# Patient Record
Sex: FEMALE | Race: WHITE | Employment: OTHER | URBAN - METROPOLITAN AREA
[De-identification: names, ages, dates, MRNs, and addresses within clinical notes are randomized per-mention and may not be internally consistent; named-entity substitution may affect disease eponyms.]

---

## 2018-09-18 ENCOUNTER — OFFICE VISIT (OUTPATIENT)
Dept: GYNECOLOGY | Age: 83
End: 2018-09-18

## 2018-09-18 ENCOUNTER — HOSPITAL ENCOUNTER (OUTPATIENT)
Dept: LAB | Age: 83
Discharge: HOME | End: 2018-09-18
Payer: MEDICARE

## 2018-09-18 ENCOUNTER — HOSPITAL ENCOUNTER (OUTPATIENT)
Dept: CT IMAGING | Age: 83
Discharge: HOME | End: 2018-09-18
Attending: OBSTETRICS & GYNECOLOGY
Payer: MEDICARE

## 2018-09-18 VITALS
SYSTOLIC BLOOD PRESSURE: 161 MMHG | HEART RATE: 69 BPM | BODY MASS INDEX: 27.6 KG/M2 | DIASTOLIC BLOOD PRESSURE: 88 MMHG | WEIGHT: 160.8 LBS

## 2018-09-18 DIAGNOSIS — C56.2 MALIGNANT NEOPLASM OF LEFT OVARY (HCC): Primary | ICD-10-CM

## 2018-09-18 DIAGNOSIS — R19.00 PELVIC MASS: ICD-10-CM

## 2018-09-18 DIAGNOSIS — C56.2 MALIGNANT NEOPLASM OF LEFT OVARY (HCC): ICD-10-CM

## 2018-09-18 LAB — CREAT BLD-MCNC: 1.5 MG/DL (ref 0.6–1.3)

## 2018-09-18 PROCEDURE — 36415 COLL VENOUS BLD VENIPUNCTURE: CPT

## 2018-09-18 PROCEDURE — 85025 COMPLETE CBC W/AUTO DIFF WBC: CPT

## 2018-09-18 PROCEDURE — 82565 ASSAY OF CREATININE: CPT

## 2018-09-18 PROCEDURE — 74176 CT ABD & PELVIS W/O CONTRAST: CPT

## 2018-09-18 PROCEDURE — 86301 IMMUNOASSAY TUMOR CA 19-9: CPT

## 2018-09-18 PROCEDURE — 80053 COMPREHEN METABOLIC PANEL: CPT

## 2018-09-18 PROCEDURE — 86304 IMMUNOASSAY TUMOR CA 125: CPT

## 2018-09-18 RX ORDER — SODIUM CHLORIDE 0.9 % (FLUSH) 0.9 %
10 SYRINGE (ML) INJECTION
Status: DISCONTINUED | OUTPATIENT
Start: 2018-09-18 | End: 2018-09-18 | Stop reason: CLARIF

## 2018-09-18 NOTE — PROGRESS NOTES
[address] [place] [address] Mathias Moritz 020, [address]14 [facility]  [phone]  MD Domingo Umanzor MD Lajuan Arena, NP    Office Note  Patient ID:  Name:  Charo Miller  MRN:  [id number]  :  6/3/1927/89 y.o. Date:  2018      HISTORY OF PRESENT ILLNESS:  Charo Miller is a 80 y.o.  postmenopausal female who was referred to me for a pelvic mass in 2016 by Dr. Adriane Youssef in [facility]. She reported having a hysterectomy in  for fibroids and ovarian cancer. She said they caught it early so she only needed a little chemotherapy. She presented to the ER at [facility] complaining of vaginal bleeding. A CT was performed there revealed a pelvic mass. They actually called it a uterus, despite her history of hysterectomy. She was also noted to have bilateral hydronephrosis, left > right. I was asked to see her for consultation and for further evaluation and management. On her initial visit I performed a transvaginal biopsy of the pelvic mass. I also sent her for tumor markers. Her CEA was normal.  Her CA-125 was normal.  Her CA 19-9 was elevated at 338. FINAL PATHOLOGIC DIAGNOSIS   Cervix/vaginal apex, biopsy:   High-grade adenocarcinoma     I also requested that she try to obtain a copy of the CT on disc and to see if they could find her initial pathology report from . This demonstrated a stage IIC, poorly differentiated, ovarian carcinoma, endometrioid type. She presented to review the pathology results and the CT images, as well as to discuss a definitive treatment plan. I did not think she was a good candidate for surgical resection at the time, as I didn't think I could could clear surgical margins. Due to her age I recommended weekly Taxol/Carbo, rather than every 3 weeks. I felt that she would tolerate that much better.   Because she lived in [facility], traveling back and forth to [place] would be difficult for her, I referred her to Dr. Arnaldo Roland with [facility] [place]. She was treated with chemotherapy with Dr. Jarad Martins and apparently had a good response. Her most recent CA-125 was normal.  She last saw him in the office a couple of months ago. She reports some recent vaginal spotting, similar to when she was diagnosed with recurrent disease. ROS:   and GI review:  Negative  Cardiopulmonary review:  Negative   Musculoskeletal:  Negative    A comprehensive review of systems was negative except for that written in the History of Present Illness. , 10 point ROS        Problem List:  Patient Active Problem List    Diagnosis Date Noted    Malignant neoplasm of left ovary ([facility] [address] 12/06/2016    Pelvic mass 11/29/2016     PMH:  Past Medical History:   Diagnosis Date    Cancer ([facility] [address] 2001    Ovarian Cancer    Diabetes ([facility] [address]       PSH:  Past Surgical History:   Procedure Laterality Date    ABDOMEN SURGERY PROC UNLISTED  1943    Appendectomy    HX HYSTERECTOMY  2001      Social History:  Social History   Substance Use Topics    Smoking status: Never Smoker    Smokeless tobacco: Never Used    Alcohol use No      Family History:  Family History   Problem Relation Age of Onset    Heart Disease Sister       Medications: (reviewed)  Current Outpatient Prescriptions   Medication Sig    hydroCHLOROthiazide (HYDRODIURIL) 25 mg tablet TAKE 1 TABLET BY MOUTH 3 TIMES A DAY    sAXagliptin (ONGLYZA) 5 mg tab tablet Take  by mouth daily.  losartan (COZAAR) 100 mg tablet Take 100 mg by mouth daily.  hydrALAZINE (APRESOLINE) 25 mg tablet Take 25 mg by mouth three (3) times daily.  enalapril (VASOTEC) 20 mg tablet Take 20 mg by mouth daily.  glipiZIDE (GLUCOTROL) 10 mg tablet Take 10 mg by mouth two (2) times a day.  latanoprost (XALATAN) 0.005 % ophthalmic solution Administer 1 Drop to both eyes nightly.      No current facility-administered medications for this visit. Allergies: (reviewed)  No Known Allergies       OBJECTIVE:    Physical Exam:  VITAL SIGNS: Vitals:    09/18/18 1342   BP: 161/88   Pulse: 69   Weight: 160 lb 12.8 oz (72.9 kg)     Body mass index is 27.6 kg/(m^2). GENERAL ALEENA: Conversant, alert, oriented. No acute distress. HEENT: HEENT. No thyroid enlargement. No JVD. Neck: Supple without restrictions. RESPIRATORY: Clear to auscultation and percussion to the bases. No CVAT. CARDIOVASC: RRR without murmur/rub. GASTROINT: soft, non-tender, without masses or organomegaly   MUSCULOSKEL: no joint tenderness, deformity or swelling   EXTREMITIES: extremities normal, atraumatic, no cyanosis or edema   PELVIC: Normal external genitalia. Normal vagina. Mass at vaginal apex measuring about 4 x 4 cm, consistent with residual/recurrent disease. The mass is mobile and non-tender. RECTAL: Deferred   ABE SURVEY: No suspicious lymphadenopathy or edema noted. NEURO: Grossly intact. No acute deficit. CT of chest/abdomen/pelvis (6/24/17)  Chest:  Stable right basilar subsegmental atelectasis and scattered pulmonary thin-walled cysts.  No new pulmonary nodule, or airspace disease.  No pleural or pericardial effusion. Stable shotty mediastinal lymph nodes.  No axillary or hilar adenopathy. Stable position of right IJ chest port. Stable 3 cm low-attenuation lesion in the left thyroid gland. Normal heart size with coronary artery calcifications as before.  Small hiatal hernia, unchanged. Abdomen/pelvis:  No interval change in mild left hydroureteronephrosis with the cause for the hydronephrosis not clearly identified.  No interval change in the left adnexal postsurgical changes which wrap around the left ureter.  Stable atrophic uterus versus supracervical hysterectomy postsurgical change.  No new pelvic masses or adenopathy. Bowel gas pattern is nonobstructed.   The liver, gallbladder, pancreas, spleen, adrenals and right kidney are also stable.  No interval change in 9 mm right adrenal gland nodule most likely a benign adenoma. No peritoneal or mesenteric implants or nodules identified. No free fluid. Skeleton: No concerning lytic or blastic lesions identified. Impression:  1.  No CT evidence of tumor recurrence in the chest, abdomen or pelvis. 2.  Stable chronic mild proximal left hydroureteronephrosis, presumably treatment related. 3.  Stable additional chronic findings as above. IMPRESSION/PLAN:  Kym Narayanan is a 80 y.o. female with a history of recurrent ovarian cancer. She likely has recurrent/peristent disease at this time. I did not repeat a biopsy today. I am going to send her for a CT scan today. I am also going to repeat tumor markers, including a CA 19-9, which was initially elevated. I will see her back after the scan to see if she might be a candidate for surgical excision.         Signed By: Madeline Mera MD     9/18/2018/1:39 PM

## 2018-09-18 NOTE — PROGRESS NOTES
Yearly check up. Patient states no abnormal spotting or bleeding. Patient states no questions or concerns for today's visit.

## 2018-09-19 LAB
ALBUMIN SERPL-MCNC: 4 G/DL (ref 3.2–4.6)
ALBUMIN/GLOB SERPL: 1.6 {RATIO} (ref 1.2–2.2)
ALP SERPL-CCNC: 57 IU/L (ref 39–117)
ALT SERPL-CCNC: 17 IU/L (ref 0–32)
AST SERPL-CCNC: 20 IU/L (ref 0–40)
BASOPHILS # BLD AUTO: 0.1 X10E3/UL (ref 0–0.2)
BASOPHILS NFR BLD AUTO: 1 %
BILIRUB SERPL-MCNC: 0.3 MG/DL (ref 0–1.2)
BUN SERPL-MCNC: 36 MG/DL (ref 10–36)
BUN/CREAT SERPL: 24 (ref 12–28)
CALCIUM SERPL-MCNC: 9.5 MG/DL (ref 8.7–10.3)
CANCER AG125 SERPL-ACNC: 13.1 U/ML (ref 0–38.1)
CANCER AG19-9 SERPL-ACNC: 11 U/ML (ref 0–35)
CHLORIDE SERPL-SCNC: 98 MMOL/L (ref 96–106)
CO2 SERPL-SCNC: 22 MMOL/L (ref 20–29)
CREAT SERPL-MCNC: 1.48 MG/DL (ref 0.57–1)
EOSINOPHIL # BLD AUTO: 0.6 X10E3/UL (ref 0–0.4)
EOSINOPHIL NFR BLD AUTO: 6 %
ERYTHROCYTE [DISTWIDTH] IN BLOOD BY AUTOMATED COUNT: 13.6 % (ref 12.3–15.4)
GLOBULIN SER CALC-MCNC: 2.5 G/DL (ref 1.5–4.5)
GLUCOSE SERPL-MCNC: 96 MG/DL (ref 65–99)
HCT VFR BLD AUTO: 34.7 % (ref 34–46.6)
HGB BLD-MCNC: 11.4 G/DL (ref 11.1–15.9)
IMM GRANULOCYTES # BLD: 0 X10E3/UL (ref 0–0.1)
IMM GRANULOCYTES NFR BLD: 0 %
LYMPHOCYTES # BLD AUTO: 3.1 X10E3/UL (ref 0.7–3.1)
LYMPHOCYTES NFR BLD AUTO: 31 %
MCH RBC QN AUTO: 30 PG (ref 26.6–33)
MCHC RBC AUTO-ENTMCNC: 32.9 G/DL (ref 31.5–35.7)
MCV RBC AUTO: 91 FL (ref 79–97)
MONOCYTES # BLD AUTO: 0.8 X10E3/UL (ref 0.1–0.9)
MONOCYTES NFR BLD AUTO: 8 %
NEUTROPHILS # BLD AUTO: 5.5 X10E3/UL (ref 1.4–7)
NEUTROPHILS NFR BLD AUTO: 54 %
PLATELET # BLD AUTO: 336 X10E3/UL (ref 150–379)
POTASSIUM SERPL-SCNC: 5.9 MMOL/L (ref 3.5–5.2)
PROT SERPL-MCNC: 6.5 G/DL (ref 6–8.5)
RBC # BLD AUTO: 3.8 X10E6/UL (ref 3.77–5.28)
SODIUM SERPL-SCNC: 133 MMOL/L (ref 134–144)
WBC # BLD AUTO: 10 X10E3/UL (ref 3.4–10.8)

## 2018-09-25 ENCOUNTER — OFFICE VISIT (OUTPATIENT)
Dept: GYNECOLOGY | Age: 83
End: 2018-09-25

## 2018-09-25 VITALS
HEIGHT: 64 IN | WEIGHT: 160 LBS | BODY MASS INDEX: 27.31 KG/M2 | HEART RATE: 70 BPM | DIASTOLIC BLOOD PRESSURE: 94 MMHG | SYSTOLIC BLOOD PRESSURE: 176 MMHG

## 2018-09-25 DIAGNOSIS — R19.00 PELVIC MASS: Primary | ICD-10-CM

## 2018-09-25 DIAGNOSIS — C56.2 MALIGNANT NEOPLASM OF LEFT OVARY (HCC): ICD-10-CM

## 2018-09-25 NOTE — PROGRESS NOTES
[address] [place] [address] Mathias Moritz [address], [address] [facility]  [phone]  MD Freddie Greer MD Kenith Mullet, NP    Office Note  Patient ID:  Name:  Princess Fernando  MRN:  [id number]  :  6/3/1927/89 y.o. Date:  2018      HISTORY OF PRESENT ILLNESS:  Princess Fernando is a 80 y.o.  postmenopausal female who was referred to me for a pelvic mass in 2016 by Dr. Herny Briggs in [facility]. She reported having a hysterectomy in  for fibroids and ovarian cancer. She said they caught it early so she only needed a little chemotherapy. She presented to the ER at [facility] complaining of vaginal bleeding. A CT was performed there revealed a pelvic mass. They actually called it a uterus, despite her history of hysterectomy. She was also noted to have bilateral hydronephrosis, left > right. I was asked to see her for consultation and for further evaluation and management. On her initial visit I performed a transvaginal biopsy of the pelvic mass. I also sent her for tumor markers. Her CEA was normal.  Her CA-125 was normal.  Her CA 19-9 was elevated at 338. FINAL PATHOLOGIC DIAGNOSIS   Cervix/vaginal apex, biopsy:   High-grade adenocarcinoma     I also requested that she try to obtain a copy of the CT on disc and to see if they could find her initial pathology report from . This demonstrated a stage IIC, poorly differentiated, ovarian carcinoma, favor endometrioid type. She presented to review the pathology results and the CT images, as well as to discuss a definitive treatment plan. I did not think she was a good candidate for surgical resection at the time, as I didn't think I could could clear surgical margins. Due to her age I recommended weekly Taxol/Carbo, rather than every 3 weeks. I felt that she would tolerate that much better.   Because she lived in [facility], traveling back and forth to [place] would be difficult for her, I referred her to Dr. Amie Garcia with [facility] [address] [place] [address]. She was treated with chemotherapy with Dr. Jason Waters and apparently had a good response. Her most recent CA-125 was normal.  She last saw him in the office a couple of months ago. She reported some recent vaginal spotting, similar to when she was diagnosed with recurrent disease. I saw her in the office on 9/18/18 and performed an exam  I noted a 4 x 4 cm lesion at the left vaginal apex, consistent with residual/recurrent disease. It was friable on exam.  I sent her for a CT of the abdomen/pelvis to evaluate. I also checked tumor markers. Her CA-125 and CA 19-9 were both normal.        ROS:   and GI review:  Negative  Cardiopulmonary review:  Negative   Musculoskeletal:  Negative    A comprehensive review of systems was negative except for that written in the History of Present Illness. , 10 point ROS        Problem List:  Patient Active Problem List    Diagnosis Date Noted    Malignant neoplasm of left ovary ([facility] [address] 12/06/2016    Pelvic mass 11/29/2016     PMH:  Past Medical History:   Diagnosis Date    Cancer ([address] 2001    Ovarian Cancer    Diabetes ([facility] [address]       PSH:  Past Surgical History:   Procedure Laterality Date    ABDOMEN SURGERY PROC UNLISTED  1943    Appendectomy    HX HYSTERECTOMY  2001      Social History:  Social History   Substance Use Topics    Smoking status: Never Smoker    Smokeless tobacco: Never Used    Alcohol use No      Family History:  Family History   Problem Relation Age of Onset    Heart Disease Sister       Medications: (reviewed)  Current Outpatient Prescriptions   Medication Sig    hydroCHLOROthiazide (HYDRODIURIL) 25 mg tablet TAKE 1 TABLET BY MOUTH 3 TIMES A DAY    sAXagliptin (ONGLYZA) 5 mg tab tablet Take  by mouth daily.  losartan (COZAAR) 100 mg tablet Take 100 mg by mouth daily.     hydrALAZINE (APRESOLINE) 25 mg tablet Take 25 mg by mouth three (3) times daily.  enalapril (VASOTEC) 20 mg tablet Take 20 mg by mouth daily.  glipiZIDE (GLUCOTROL) 10 mg tablet Take 10 mg by mouth two (2) times a day.  latanoprost (XALATAN) 0.005 % ophthalmic solution Administer 1 Drop to both eyes nightly. No current facility-administered medications for this visit. Allergies: (reviewed)  No Known Allergies       OBJECTIVE:    Physical Exam:  VITAL SIGNS: Vitals:    09/25/18 1334   BP: (!) 176/94   Pulse: 70   Weight: 160 lb (72.6 kg)   Height: 5' 4.02\" (1.626 m)     Body mass index is 27.45 kg/(m^2). GENERAL ALEENA: Conversant, alert, oriented. No acute distress. HEENT: HEENT. No thyroid enlargement. No JVD. Neck: Supple without restrictions. RESPIRATORY: Clear to auscultation and percussion to the bases. No CVAT. CARDIOVASC: RRR without murmur/rub. GASTROINT: soft, non-tender, without masses or organomegaly   MUSCULOSKEL: no joint tenderness, deformity or swelling   EXTREMITIES: extremities normal, atraumatic, no cyanosis or edema   PELVIC: (9/18/18 exam) Normal external genitalia. Normal vagina. Mass at vaginal apex measuring about 4 x 4 cm, consistent with residual/recurrent disease. The mass is mobile and non-tender. RECTAL: Deferred   ABE SURVEY: No suspicious lymphadenopathy or edema noted. NEURO: Grossly intact. No acute deficit. CT of abdomen/pelvis (9/18/18)  LOWER CHEST: There is minimal scar or atelectasis at the lung bases. There are  calcifications of the aortic valve and coronary arteries. The absence of intravenous contrast material reduces the sensitivity for  evaluation of the solid parenchymal organs of the abdomen. ABDOMEN:  Liver: The liver is normal in size and contour with no focal abnormality. Gallbladder and bile ducts: There are no calcified stones and there is no  biliary duct dilatation. Spleen: No abnormality. Pancreas: No abnormality. Adrenal glands: No abnormality.   Kidneys: There is a small left renal cyst and left renal cortical thinning. There is no renal or ureteral calculus or obstruction. PELVIS:  Reproductive organs: The uterus and ovaries are absent. There is a 3.5 x 3.1 x  3.4 cm soft tissue mass in the left pelvic cul-de-sac between the urinary  bladder and rectum. Bladder: No abnormality. RETROPERITONEUM: The aorta is atherosclerotic and tapers without aneurysm. There  is no retroperitoneal adenopathy or mass. There is no pelvic mass or adenopathy. BOWEL AND MESENTERY: The small bowel is normal. The appendix is absent. PERITONEUM: There is no ascites or free intraperitoneal air. BONES AND SOFT TISSUES: There are degenerative changes of the spine. There are  surgical clips in the upper anterior abdominal wall. IMPRESSION:   1. Status post hysterectomy and bilateral oophorectomy. 2. Left pelvic soft tissue mass. 3. No other evidence of recurrent or metastatic disease within the abdomen or  pelvis. 4. 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  5. Atherosclerotic abdominal aorta without aneurysm. 6. Status post appendectomy. 7. Lumbar spondylosis. IMPRESSION/PLAN:  Kath Hanley is a 80 y.o. female with a history of recurrent ovarian cancer. She now has recurrent mass at the left upper vagina. She had a supracervical hysterectomy, but this does not appear consistent with cervix. We discussed options but we decided on laparoscopic evaluation with resection. She was counseled on the risks, benefits, indications, and alternatives of surgery. Her questions were answered and she wishes to proceed.          Signed By: Duke Chaudhry MD     9/25/2018/1:39 PM

## 2018-10-08 ENCOUNTER — ANESTHESIA EVENT (OUTPATIENT)
Dept: SURGERY | Age: 83
DRG: 747 | End: 2018-10-08
Payer: MEDICARE

## 2018-10-08 ENCOUNTER — ANESTHESIA (OUTPATIENT)
Dept: SURGERY | Age: 83
DRG: 747 | End: 2018-10-08
Payer: MEDICARE

## 2018-10-08 ENCOUNTER — HOSPITAL ENCOUNTER (INPATIENT)
Age: 83
LOS: 1 days | Discharge: HOME | DRG: 747 | End: 2018-10-09
Attending: OBSTETRICS & GYNECOLOGY | Admitting: OBSTETRICS & GYNECOLOGY
Payer: MEDICARE

## 2018-10-08 DIAGNOSIS — G89.18 POSTOPERATIVE PAIN: Primary | ICD-10-CM

## 2018-10-08 PROBLEM — C76.3 PELVIC CANCER (HCC): Status: ACTIVE | Noted: 2018-10-08

## 2018-10-08 LAB
ATRIAL RATE: 64 BPM
CALCULATED P AXIS, ECG09: 43 DEGREES
CALCULATED R AXIS, ECG10: 0 DEGREES
CALCULATED T AXIS, ECG11: 24 DEGREES
DIAGNOSIS, 93000: NORMAL
GLUCOSE BLD STRIP.AUTO-MCNC: 101 MG/DL (ref 65–100)
GLUCOSE BLD STRIP.AUTO-MCNC: 120 MG/DL (ref 65–100)
GLUCOSE BLD STRIP.AUTO-MCNC: 127 MG/DL (ref 65–100)
P-R INTERVAL, ECG05: 212 MS
Q-T INTERVAL, ECG07: 412 MS
QRS DURATION, ECG06: 94 MS
QTC CALCULATION (BEZET), ECG08: 425 MS
SERVICE CMNT-IMP: ABNORMAL
VENTRICULAR RATE, ECG03: 64 BPM

## 2018-10-08 PROCEDURE — 0UBG7ZX EXCISION OF VAGINA, VIA NATURAL OR ARTIFICIAL OPENING, DIAGNOSTIC: ICD-10-PCS | Performed by: OBSTETRICS & GYNECOLOGY

## 2018-10-08 PROCEDURE — 74011250636 HC RX REV CODE- 250/636

## 2018-10-08 PROCEDURE — 65410000002 HC RM PRIVATE OB

## 2018-10-08 PROCEDURE — 77030002968 HC SUT PDS LSIS -B: Performed by: OBSTETRICS & GYNECOLOGY

## 2018-10-08 PROCEDURE — 77030031139 HC SUT VCRL2 J&J -A: Performed by: OBSTETRICS & GYNECOLOGY

## 2018-10-08 PROCEDURE — 74011250636 HC RX REV CODE- 250/636: Performed by: ANESTHESIOLOGY

## 2018-10-08 PROCEDURE — 77030019908 HC STETH ESOPH SIMS -A: Performed by: ANESTHESIOLOGY

## 2018-10-08 PROCEDURE — 88112 CYTOPATH CELL ENHANCE TECH: CPT | Performed by: OBSTETRICS & GYNECOLOGY

## 2018-10-08 PROCEDURE — 74011250637 HC RX REV CODE- 250/637: Performed by: PHYSICIAN ASSISTANT

## 2018-10-08 PROCEDURE — 74011000250 HC RX REV CODE- 250

## 2018-10-08 PROCEDURE — 77030035045 HC TRCR ENDOSC VRSPRT BLDLSS COVD -B: Performed by: OBSTETRICS & GYNECOLOGY

## 2018-10-08 PROCEDURE — 77030010031 HC SCIS ENDOSC MPLR J&J -C: Performed by: OBSTETRICS & GYNECOLOGY

## 2018-10-08 PROCEDURE — 74011250636 HC RX REV CODE- 250/636: Performed by: OBSTETRICS & GYNECOLOGY

## 2018-10-08 PROCEDURE — 77030035051: Performed by: OBSTETRICS & GYNECOLOGY

## 2018-10-08 PROCEDURE — 77030026438 HC STYL ET INTUB CARD -A: Performed by: ANESTHESIOLOGY

## 2018-10-08 PROCEDURE — 88305 TISSUE EXAM BY PATHOLOGIST: CPT | Performed by: OBSTETRICS & GYNECOLOGY

## 2018-10-08 PROCEDURE — 74011000258 HC RX REV CODE- 258: Performed by: OBSTETRICS & GYNECOLOGY

## 2018-10-08 PROCEDURE — 0UBC4ZZ EXCISION OF CERVIX, PERCUTANEOUS ENDOSCOPIC APPROACH: ICD-10-PCS | Performed by: OBSTETRICS & GYNECOLOGY

## 2018-10-08 PROCEDURE — 74011000250 HC RX REV CODE- 250: Performed by: PHYSICIAN ASSISTANT

## 2018-10-08 PROCEDURE — 77030020782 HC GWN BAIR PAWS FLX 3M -B

## 2018-10-08 PROCEDURE — 88307 TISSUE EXAM BY PATHOLOGIST: CPT | Performed by: OBSTETRICS & GYNECOLOGY

## 2018-10-08 PROCEDURE — 77030002933 HC SUT MCRYL J&J -A: Performed by: OBSTETRICS & GYNECOLOGY

## 2018-10-08 PROCEDURE — 77030008771 HC TU NG SALEM SUMP -A: Performed by: ANESTHESIOLOGY

## 2018-10-08 PROCEDURE — 77030035048 HC TRCR ENDOSC OPTCL COVD -B: Performed by: OBSTETRICS & GYNECOLOGY

## 2018-10-08 PROCEDURE — 76060000037 HC ANESTHESIA 3 TO 3.5 HR: Performed by: OBSTETRICS & GYNECOLOGY

## 2018-10-08 PROCEDURE — 77030018836 HC SOL IRR NACL ICUM -A: Performed by: OBSTETRICS & GYNECOLOGY

## 2018-10-08 PROCEDURE — 77030013079 HC BLNKT BAIR HGGR 3M -A: Performed by: ANESTHESIOLOGY

## 2018-10-08 PROCEDURE — 88331 PATH CONSLTJ SURG 1 BLK 1SPC: CPT | Performed by: OBSTETRICS & GYNECOLOGY

## 2018-10-08 PROCEDURE — 93005 ELECTROCARDIOGRAM TRACING: CPT

## 2018-10-08 PROCEDURE — 77030039266 HC ADH SKN EXOFIN S2SG -A: Performed by: OBSTETRICS & GYNECOLOGY

## 2018-10-08 PROCEDURE — 77030008684 HC TU ET CUF COVD -B: Performed by: ANESTHESIOLOGY

## 2018-10-08 PROCEDURE — 77030034154 HC SHR COAG HARM ACE J&J -F: Performed by: OBSTETRICS & GYNECOLOGY

## 2018-10-08 PROCEDURE — 77030002904 HC SUT DEV RNNG LSIS -C: Performed by: OBSTETRICS & GYNECOLOGY

## 2018-10-08 PROCEDURE — 0TJB8ZZ INSPECTION OF BLADDER, VIA NATURAL OR ARTIFICIAL OPENING ENDOSCOPIC: ICD-10-PCS | Performed by: OBSTETRICS & GYNECOLOGY

## 2018-10-08 PROCEDURE — 77030019927 HC TBNG IRR CYSTO BAXT -A: Performed by: OBSTETRICS & GYNECOLOGY

## 2018-10-08 PROCEDURE — 74011000250 HC RX REV CODE- 250: Performed by: OBSTETRICS & GYNECOLOGY

## 2018-10-08 PROCEDURE — 77030020263 HC SOL INJ SOD CL0.9% LFCR 1000ML: Performed by: OBSTETRICS & GYNECOLOGY

## 2018-10-08 PROCEDURE — 77030002903 HC SUT DEV PLCMNT LSIS -C: Performed by: OBSTETRICS & GYNECOLOGY

## 2018-10-08 PROCEDURE — 77030018846 HC SOL IRR STRL H20 ICUM -A: Performed by: OBSTETRICS & GYNECOLOGY

## 2018-10-08 PROCEDURE — 77030018832 HC SOL IRR H20 ICUM -A: Performed by: OBSTETRICS & GYNECOLOGY

## 2018-10-08 PROCEDURE — 76010000132 HC OR TIME 2.5 TO 3 HR: Performed by: OBSTETRICS & GYNECOLOGY

## 2018-10-08 PROCEDURE — 74011250636 HC RX REV CODE- 250/636: Performed by: PHYSICIAN ASSISTANT

## 2018-10-08 PROCEDURE — 76210000017 HC OR PH I REC 1.5 TO 2 HR: Performed by: OBSTETRICS & GYNECOLOGY

## 2018-10-08 PROCEDURE — 77030034696 HC CATH URETH FOL 2W BARD -A: Performed by: OBSTETRICS & GYNECOLOGY

## 2018-10-08 PROCEDURE — 77030032490 HC SLV COMPR SCD KNE COVD -B: Performed by: OBSTETRICS & GYNECOLOGY

## 2018-10-08 PROCEDURE — 77030032060 HC PWDR HEMSTAT ARISTA ASRB 3GM BARD -C: Performed by: OBSTETRICS & GYNECOLOGY

## 2018-10-08 PROCEDURE — 77030011640 HC PAD GRND REM COVD -A: Performed by: OBSTETRICS & GYNECOLOGY

## 2018-10-08 PROCEDURE — 82962 GLUCOSE BLOOD TEST: CPT

## 2018-10-08 PROCEDURE — 77030002882 HC SUT CART KNOT LSIS -B: Performed by: OBSTETRICS & GYNECOLOGY

## 2018-10-08 PROCEDURE — 77030027743 HC APPL F/HEMSTAT BARD -B: Performed by: OBSTETRICS & GYNECOLOGY

## 2018-10-08 PROCEDURE — 77030008756 HC TU IRR SUC STRY -B: Performed by: OBSTETRICS & GYNECOLOGY

## 2018-10-08 RX ORDER — DIPHENHYDRAMINE HCL 25 MG
25 CAPSULE ORAL
Status: DISCONTINUED | OUTPATIENT
Start: 2018-10-08 | End: 2018-10-09 | Stop reason: HOSPADM

## 2018-10-08 RX ORDER — LATANOPROST 50 UG/ML
1 SOLUTION/ DROPS OPHTHALMIC
Status: DISCONTINUED | OUTPATIENT
Start: 2018-10-08 | End: 2018-10-09 | Stop reason: HOSPADM

## 2018-10-08 RX ORDER — SODIUM CHLORIDE 0.9 % (FLUSH) 0.9 %
5-10 SYRINGE (ML) INJECTION EVERY 8 HOURS
Status: DISCONTINUED | OUTPATIENT
Start: 2018-10-08 | End: 2018-10-09 | Stop reason: HOSPADM

## 2018-10-08 RX ORDER — MAGNESIUM SULFATE 100 %
4 CRYSTALS MISCELLANEOUS AS NEEDED
Status: DISCONTINUED | OUTPATIENT
Start: 2018-10-08 | End: 2018-10-09 | Stop reason: HOSPADM

## 2018-10-08 RX ORDER — SODIUM CHLORIDE, SODIUM LACTATE, POTASSIUM CHLORIDE, CALCIUM CHLORIDE 600; 310; 30; 20 MG/100ML; MG/100ML; MG/100ML; MG/100ML
INJECTION, SOLUTION INTRAVENOUS
Status: DISCONTINUED | OUTPATIENT
Start: 2018-10-08 | End: 2018-10-08 | Stop reason: HOSPADM

## 2018-10-08 RX ORDER — FUROSEMIDE 10 MG/ML
INJECTION INTRAMUSCULAR; INTRAVENOUS AS NEEDED
Status: DISCONTINUED | OUTPATIENT
Start: 2018-10-08 | End: 2018-10-08 | Stop reason: HOSPADM

## 2018-10-08 RX ORDER — PHENYLEPHRINE HCL IN 0.9% NACL 0.4MG/10ML
SYRINGE (ML) INTRAVENOUS AS NEEDED
Status: DISCONTINUED | OUTPATIENT
Start: 2018-10-08 | End: 2018-10-08 | Stop reason: HOSPADM

## 2018-10-08 RX ORDER — ACETAMINOPHEN 10 MG/ML
INJECTION, SOLUTION INTRAVENOUS AS NEEDED
Status: DISCONTINUED | OUTPATIENT
Start: 2018-10-08 | End: 2018-10-08 | Stop reason: HOSPADM

## 2018-10-08 RX ORDER — OXYCODONE HYDROCHLORIDE 5 MG/1
5 TABLET ORAL
Status: DISCONTINUED | OUTPATIENT
Start: 2018-10-08 | End: 2018-10-09 | Stop reason: HOSPADM

## 2018-10-08 RX ORDER — SODIUM CHLORIDE 9 MG/ML
50 INJECTION, SOLUTION INTRAVENOUS CONTINUOUS
Status: DISCONTINUED | OUTPATIENT
Start: 2018-10-08 | End: 2018-10-08 | Stop reason: HOSPADM

## 2018-10-08 RX ORDER — GLYCOPYRROLATE 0.2 MG/ML
INJECTION INTRAMUSCULAR; INTRAVENOUS AS NEEDED
Status: DISCONTINUED | OUTPATIENT
Start: 2018-10-08 | End: 2018-10-08 | Stop reason: HOSPADM

## 2018-10-08 RX ORDER — METHYLENE BLUE 10 MG/ML
INJECTION INTRAVENOUS AS NEEDED
Status: DISCONTINUED | OUTPATIENT
Start: 2018-10-08 | End: 2018-10-08 | Stop reason: HOSPADM

## 2018-10-08 RX ORDER — OXYCODONE AND ACETAMINOPHEN 5; 325 MG/1; MG/1
1 TABLET ORAL AS NEEDED
Status: DISCONTINUED | OUTPATIENT
Start: 2018-10-08 | End: 2018-10-08 | Stop reason: HOSPADM

## 2018-10-08 RX ORDER — SODIUM CHLORIDE 0.9 % (FLUSH) 0.9 %
5-10 SYRINGE (ML) INJECTION EVERY 8 HOURS
Status: DISCONTINUED | OUTPATIENT
Start: 2018-10-08 | End: 2018-10-08 | Stop reason: HOSPADM

## 2018-10-08 RX ORDER — POLYETHYLENE GLYCOL 3350 17 G/17G
17 POWDER, FOR SOLUTION ORAL DAILY
Qty: 238 G | Refills: 0 | Status: SHIPPED | OUTPATIENT
Start: 2018-10-08

## 2018-10-08 RX ORDER — DEXAMETHASONE SODIUM PHOSPHATE 4 MG/ML
INJECTION, SOLUTION INTRA-ARTICULAR; INTRALESIONAL; INTRAMUSCULAR; INTRAVENOUS; SOFT TISSUE AS NEEDED
Status: DISCONTINUED | OUTPATIENT
Start: 2018-10-08 | End: 2018-10-08 | Stop reason: HOSPADM

## 2018-10-08 RX ORDER — MIDAZOLAM HYDROCHLORIDE 1 MG/ML
1 INJECTION, SOLUTION INTRAMUSCULAR; INTRAVENOUS AS NEEDED
Status: DISCONTINUED | OUTPATIENT
Start: 2018-10-08 | End: 2018-10-08 | Stop reason: HOSPADM

## 2018-10-08 RX ORDER — MORPHINE SULFATE 4 MG/ML
INJECTION, SOLUTION INTRAMUSCULAR; INTRAVENOUS AS NEEDED
Status: DISCONTINUED | OUTPATIENT
Start: 2018-10-08 | End: 2018-10-08 | Stop reason: HOSPADM

## 2018-10-08 RX ORDER — INSULIN LISPRO 100 [IU]/ML
INJECTION, SOLUTION INTRAVENOUS; SUBCUTANEOUS
Status: DISCONTINUED | OUTPATIENT
Start: 2018-10-08 | End: 2018-10-09 | Stop reason: HOSPADM

## 2018-10-08 RX ORDER — MIDAZOLAM HYDROCHLORIDE 1 MG/ML
0.5 INJECTION, SOLUTION INTRAMUSCULAR; INTRAVENOUS
Status: DISCONTINUED | OUTPATIENT
Start: 2018-10-08 | End: 2018-10-08 | Stop reason: HOSPADM

## 2018-10-08 RX ORDER — FENTANYL CITRATE 50 UG/ML
INJECTION, SOLUTION INTRAMUSCULAR; INTRAVENOUS AS NEEDED
Status: DISCONTINUED | OUTPATIENT
Start: 2018-10-08 | End: 2018-10-08 | Stop reason: HOSPADM

## 2018-10-08 RX ORDER — PROCHLORPERAZINE EDISYLATE 5 MG/ML
5 INJECTION INTRAMUSCULAR; INTRAVENOUS
Status: DISCONTINUED | OUTPATIENT
Start: 2018-10-08 | End: 2018-10-08 | Stop reason: SDUPTHER

## 2018-10-08 RX ORDER — LISINOPRIL 20 MG/1
20 TABLET ORAL DAILY
Status: DISCONTINUED | OUTPATIENT
Start: 2018-10-09 | End: 2018-10-09 | Stop reason: HOSPADM

## 2018-10-08 RX ORDER — DOCUSATE SODIUM 100 MG/1
100 CAPSULE, LIQUID FILLED ORAL 2 TIMES DAILY
Status: DISCONTINUED | OUTPATIENT
Start: 2018-10-09 | End: 2018-10-09 | Stop reason: HOSPADM

## 2018-10-08 RX ORDER — ACETAMINOPHEN 325 MG/1
650 TABLET ORAL
Status: DISCONTINUED | OUTPATIENT
Start: 2018-10-08 | End: 2018-10-09 | Stop reason: HOSPADM

## 2018-10-08 RX ORDER — ONDANSETRON 2 MG/ML
INJECTION INTRAMUSCULAR; INTRAVENOUS AS NEEDED
Status: DISCONTINUED | OUTPATIENT
Start: 2018-10-08 | End: 2018-10-08 | Stop reason: HOSPADM

## 2018-10-08 RX ORDER — ENOXAPARIN SODIUM 100 MG/ML
30 INJECTION SUBCUTANEOUS EVERY 24 HOURS
Status: DISCONTINUED | OUTPATIENT
Start: 2018-10-09 | End: 2018-10-09 | Stop reason: HOSPADM

## 2018-10-08 RX ORDER — LIDOCAINE HYDROCHLORIDE 20 MG/ML
INJECTION, SOLUTION EPIDURAL; INFILTRATION; INTRACAUDAL; PERINEURAL AS NEEDED
Status: DISCONTINUED | OUTPATIENT
Start: 2018-10-08 | End: 2018-10-08 | Stop reason: HOSPADM

## 2018-10-08 RX ORDER — LIDOCAINE HYDROCHLORIDE 10 MG/ML
0.1 INJECTION, SOLUTION EPIDURAL; INFILTRATION; INTRACAUDAL; PERINEURAL AS NEEDED
Status: DISCONTINUED | OUTPATIENT
Start: 2018-10-08 | End: 2018-10-08 | Stop reason: HOSPADM

## 2018-10-08 RX ORDER — SODIUM CHLORIDE 0.9 % (FLUSH) 0.9 %
5-10 SYRINGE (ML) INJECTION AS NEEDED
Status: DISCONTINUED | OUTPATIENT
Start: 2018-10-08 | End: 2018-10-08 | Stop reason: HOSPADM

## 2018-10-08 RX ORDER — FENTANYL CITRATE 50 UG/ML
50 INJECTION, SOLUTION INTRAMUSCULAR; INTRAVENOUS AS NEEDED
Status: DISCONTINUED | OUTPATIENT
Start: 2018-10-08 | End: 2018-10-08 | Stop reason: HOSPADM

## 2018-10-08 RX ORDER — HYDROCODONE BITARTRATE AND ACETAMINOPHEN 5; 325 MG/1; MG/1
1 TABLET ORAL
Qty: 15 TAB | Refills: 0 | Status: SHIPPED | OUTPATIENT
Start: 2018-10-08 | End: 2018-11-08 | Stop reason: ALTCHOICE

## 2018-10-08 RX ORDER — SODIUM CHLORIDE 0.9 % (FLUSH) 0.9 %
5-10 SYRINGE (ML) INJECTION AS NEEDED
Status: DISCONTINUED | OUTPATIENT
Start: 2018-10-08 | End: 2018-10-09 | Stop reason: HOSPADM

## 2018-10-08 RX ORDER — SODIUM CHLORIDE 9 MG/ML
75 INJECTION, SOLUTION INTRAVENOUS CONTINUOUS
Status: DISCONTINUED | OUTPATIENT
Start: 2018-10-08 | End: 2018-10-09 | Stop reason: HOSPADM

## 2018-10-08 RX ORDER — MORPHINE SULFATE 10 MG/ML
2 INJECTION, SOLUTION INTRAMUSCULAR; INTRAVENOUS
Status: DISCONTINUED | OUTPATIENT
Start: 2018-10-08 | End: 2018-10-08 | Stop reason: HOSPADM

## 2018-10-08 RX ORDER — NALOXONE HYDROCHLORIDE 0.4 MG/ML
0.4 INJECTION, SOLUTION INTRAMUSCULAR; INTRAVENOUS; SUBCUTANEOUS AS NEEDED
Status: DISCONTINUED | OUTPATIENT
Start: 2018-10-08 | End: 2018-10-09 | Stop reason: HOSPADM

## 2018-10-08 RX ORDER — HYDRALAZINE HYDROCHLORIDE 25 MG/1
25 TABLET, FILM COATED ORAL 3 TIMES DAILY
Status: DISCONTINUED | OUTPATIENT
Start: 2018-10-08 | End: 2018-10-09 | Stop reason: HOSPADM

## 2018-10-08 RX ORDER — ONDANSETRON 2 MG/ML
4 INJECTION INTRAMUSCULAR; INTRAVENOUS AS NEEDED
Status: DISCONTINUED | OUTPATIENT
Start: 2018-10-08 | End: 2018-10-08 | Stop reason: HOSPADM

## 2018-10-08 RX ORDER — SODIUM CHLORIDE, SODIUM LACTATE, POTASSIUM CHLORIDE, CALCIUM CHLORIDE 600; 310; 30; 20 MG/100ML; MG/100ML; MG/100ML; MG/100ML
75 INJECTION, SOLUTION INTRAVENOUS CONTINUOUS
Status: DISCONTINUED | OUTPATIENT
Start: 2018-10-08 | End: 2018-10-08 | Stop reason: HOSPADM

## 2018-10-08 RX ORDER — PROPOFOL 10 MG/ML
INJECTION, EMULSION INTRAVENOUS AS NEEDED
Status: DISCONTINUED | OUTPATIENT
Start: 2018-10-08 | End: 2018-10-08 | Stop reason: HOSPADM

## 2018-10-08 RX ORDER — ROCURONIUM BROMIDE 10 MG/ML
INJECTION, SOLUTION INTRAVENOUS AS NEEDED
Status: DISCONTINUED | OUTPATIENT
Start: 2018-10-08 | End: 2018-10-08 | Stop reason: HOSPADM

## 2018-10-08 RX ORDER — DEXTROSE 50 % IN WATER (D50W) INTRAVENOUS SYRINGE
25-50 AS NEEDED
Status: DISCONTINUED | OUTPATIENT
Start: 2018-10-08 | End: 2018-10-09 | Stop reason: HOSPADM

## 2018-10-08 RX ORDER — DIPHENHYDRAMINE HYDROCHLORIDE 50 MG/ML
12.5 INJECTION, SOLUTION INTRAMUSCULAR; INTRAVENOUS AS NEEDED
Status: DISCONTINUED | OUTPATIENT
Start: 2018-10-08 | End: 2018-10-08 | Stop reason: HOSPADM

## 2018-10-08 RX ORDER — MORPHINE SULFATE 10 MG/ML
5 INJECTION, SOLUTION INTRAMUSCULAR; INTRAVENOUS
Status: DISCONTINUED | OUTPATIENT
Start: 2018-10-08 | End: 2018-10-09 | Stop reason: HOSPADM

## 2018-10-08 RX ORDER — NEOSTIGMINE METHYLSULFATE 1 MG/ML
INJECTION INTRAVENOUS AS NEEDED
Status: DISCONTINUED | OUTPATIENT
Start: 2018-10-08 | End: 2018-10-08 | Stop reason: HOSPADM

## 2018-10-08 RX ORDER — FENTANYL CITRATE 50 UG/ML
25 INJECTION, SOLUTION INTRAMUSCULAR; INTRAVENOUS
Status: DISCONTINUED | OUTPATIENT
Start: 2018-10-08 | End: 2018-10-08 | Stop reason: HOSPADM

## 2018-10-08 RX ADMIN — FENTANYL CITRATE 50 MCG: 50 INJECTION, SOLUTION INTRAMUSCULAR; INTRAVENOUS at 11:36

## 2018-10-08 RX ADMIN — LIDOCAINE HYDROCHLORIDE 0.1 ML: 10 INJECTION, SOLUTION EPIDURAL; INFILTRATION; INTRACAUDAL; PERINEURAL at 09:16

## 2018-10-08 RX ADMIN — ROCURONIUM BROMIDE 30 MG: 10 INJECTION, SOLUTION INTRAVENOUS at 10:59

## 2018-10-08 RX ADMIN — Medication 80 MCG: at 11:24

## 2018-10-08 RX ADMIN — DEXAMETHASONE SODIUM PHOSPHATE 4 MG: 4 INJECTION, SOLUTION INTRA-ARTICULAR; INTRALESIONAL; INTRAMUSCULAR; INTRAVENOUS; SOFT TISSUE at 11:24

## 2018-10-08 RX ADMIN — CEFOTETAN DISODIUM 2 G: 2 INJECTION, POWDER, FOR SOLUTION INTRAMUSCULAR; INTRAVENOUS at 11:13

## 2018-10-08 RX ADMIN — LIDOCAINE HYDROCHLORIDE 60 MG: 20 INJECTION, SOLUTION EPIDURAL; INFILTRATION; INTRACAUDAL; PERINEURAL at 10:59

## 2018-10-08 RX ADMIN — ROCURONIUM BROMIDE 10 MG: 10 INJECTION, SOLUTION INTRAVENOUS at 12:03

## 2018-10-08 RX ADMIN — MORPHINE SULFATE 2 MG: 4 INJECTION, SOLUTION INTRAMUSCULAR; INTRAVENOUS at 13:18

## 2018-10-08 RX ADMIN — Medication 80 MCG: at 11:10

## 2018-10-08 RX ADMIN — SODIUM CHLORIDE, SODIUM LACTATE, POTASSIUM CHLORIDE, CALCIUM CHLORIDE: 600; 310; 30; 20 INJECTION, SOLUTION INTRAVENOUS at 10:58

## 2018-10-08 RX ADMIN — ACETAMINOPHEN 1000 MG: 10 INJECTION, SOLUTION INTRAVENOUS at 11:31

## 2018-10-08 RX ADMIN — SODIUM CHLORIDE, SODIUM LACTATE, POTASSIUM CHLORIDE, AND CALCIUM CHLORIDE 75 ML/HR: 600; 310; 30; 20 INJECTION, SOLUTION INTRAVENOUS at 09:15

## 2018-10-08 RX ADMIN — HYDRALAZINE HYDROCHLORIDE 25 MG: 25 TABLET, FILM COATED ORAL at 21:10

## 2018-10-08 RX ADMIN — ONDANSETRON 4 MG: 2 INJECTION INTRAMUSCULAR; INTRAVENOUS at 13:25

## 2018-10-08 RX ADMIN — HYDRALAZINE HYDROCHLORIDE 25 MG: 25 TABLET, FILM COATED ORAL at 18:02

## 2018-10-08 RX ADMIN — SODIUM CHLORIDE 75 ML/HR: 900 INJECTION, SOLUTION INTRAVENOUS at 14:52

## 2018-10-08 RX ADMIN — METHYLENE BLUE 8 ML: 10 INJECTION INTRAVENOUS at 12:25

## 2018-10-08 RX ADMIN — SODIUM CHLORIDE 75 ML/HR: 900 INJECTION, SOLUTION INTRAVENOUS at 14:08

## 2018-10-08 RX ADMIN — FUROSEMIDE 10 MG: 10 INJECTION INTRAMUSCULAR; INTRAVENOUS at 12:57

## 2018-10-08 RX ADMIN — GLYCOPYRROLATE 0.6 MG: 0.2 INJECTION INTRAMUSCULAR; INTRAVENOUS at 13:27

## 2018-10-08 RX ADMIN — FENTANYL CITRATE 50 MCG: 50 INJECTION, SOLUTION INTRAMUSCULAR; INTRAVENOUS at 10:59

## 2018-10-08 RX ADMIN — PROPOFOL 100 MG: 10 INJECTION, EMULSION INTRAVENOUS at 10:59

## 2018-10-08 RX ADMIN — PROPOFOL 40 MG: 10 INJECTION, EMULSION INTRAVENOUS at 11:38

## 2018-10-08 RX ADMIN — NEOSTIGMINE METHYLSULFATE 4 MG: 1 INJECTION INTRAVENOUS at 13:27

## 2018-10-08 RX ADMIN — LATANOPROST 1 DROP: 50 SOLUTION OPHTHALMIC at 21:08

## 2018-10-08 RX ADMIN — FENTANYL CITRATE 25 MCG: 50 INJECTION, SOLUTION INTRAMUSCULAR; INTRAVENOUS at 13:28

## 2018-10-08 NOTE — IP AVS SNAPSHOT
[address] 
[phone] Patient: Augustus Sahu MRN: [id number] UOC:[birth year] A check susi indicates which time of day the medication should be taken. My Medications START taking these medications Instructions Each Dose to Equal  
 Morning Noon Evening Bedtime HYDROcodone-acetaminophen 5-325 mg per tablet Commonly known as:  [place] No Your last dose was: Your next dose is: Take 1 Tab by mouth every six (6) hours as needed for Pain. Max Daily Amount: 4 Tabs. 1 Tab  
    
   
   
   
  
 polyethylene glycol 17 gram/dose powder Commonly known as:  Veronique Royalty Your last dose was: Your next dose is: Take 17 g by mouth daily. 17 g CONTINUE taking these medications Instructions Each Dose to Equal  
 Morning Noon Evening Bedtime  
 enalapril 20 mg tablet Commonly known as:  Estefany Navarretei Your last dose was: Your next dose is: Take 20 mg by mouth daily. 20 mg  
    
   
   
   
  
 glipiZIDE 10 mg tablet Commonly known as:  Moris Fragmin Your last dose was: Your next dose is: Take 10 mg by mouth two (2) times a day. 10 mg  
    
   
   
   
  
 hydrALAZINE 25 mg tablet Commonly known as:  APRESOLINE Your last dose was: Your next dose is: Take 25 mg by mouth three (3) times daily. 25 mg  
    
   
   
   
  
 hydroCHLOROthiazide 25 mg tablet Commonly known as:  HYDRODIURIL Your last dose was: Your next dose is: TAKE 1 TABLET BY MOUTH 3 TIMES A DAY  
     
   
   
   
  
 latanoprost 0.005 % ophthalmic solution Commonly known as:  Author Holt Your last dose was: Your next dose is:    
   
   
 Administer 1 Drop to both eyes nightly. 1 Drop  
    
   
   
   
  
 losartan 100 mg tablet Commonly known as:  COZAAR Your last dose was: Your next dose is: Take 100 mg by mouth daily. 100 mg ONGLYZA 5 mg Tab tablet Generic drug:  sAXagliptin Your last dose was: Your next dose is: Take  by mouth daily. Where to Get Your Medications Information on where to get these meds will be given to you by the nurse or doctor. ! Ask your nurse or doctor about these medications HYDROcodone-acetaminophen 5-325 mg per tablet  
 polyethylene glycol 17 gram/dose powder

## 2018-10-08 NOTE — PERIOP NOTES
TRANSFER - OUT REPORT: 
 
Verbal report given to Amanda(name) on Seng Hard  being transferred to [facility](unit) for routine post - op Report consisted of patients Situation, Background, Assessment and  
Recommendations(SBAR). Time Pre op antibiotic given:1130 Anesthesia Stop time: 1350 Morales Present on Transfer to floor:yes Order for Morales on Chart:yes Discharge Prescriptions with Chart:no Information from the following report(s) SBAR, Kardex, OR Summary, Procedure Summary, Intake/Output, MAR, Recent Results and Med Rec Status was reviewed with the receiving nurse. Opportunity for questions and clarification was provided. Is the patient on 02? YES 
     L/Min 2 Other Is the patient on a monitor? NO Is the nurse transporting with the patient? NO Surgical Waiting Area notified of patient's transfer from PACU? YES The following personal items collected during your admission accompanied patient upon transfer:  
Dental Appliance: Dental Appliances: Lowers, Uppers, Partials (sent to pacu) Vision: Visual Aid: Glasses (sent to [employer]) Hearing Aid: Hearing Aid: Bilateral 
Jewelry: Jewelry: None Clothing: Clothing: Footwear, Pants, Undergarments, Socks, Shirt (sent to [employer]) Other Valuables: Other Valuables: Cane (sent topacu) Valuables sent to safe:   
 
Clothes, hearing aids, cane sent to floor.

## 2018-10-08 NOTE — H&P
[facility], [address] Margie LOZOYA [phone]  F [phone] Patient ID: 
Name:  Sherin Ryan MRN:  [id number] :  6/3/1927/89 y.o. Date:  10/8/2018 HISTORY OF PRESENT ILLNESS: 
Sherin Ryan is a 80 y.o.  postmenopausal female who was referred to me for a pelvic mass in 2016 by Dr. Adelfo Yoder in [facility]. She reported having a hysterectomy in  for fibroids and ovarian cancer. She said they caught it early so she only needed a little chemotherapy. She presented to the ER at [facility] complaining of vaginal bleeding. A CT was performed there revealed a pelvic mass. They actually called it a uterus, despite her history of hysterectomy. She was also noted to have bilateral hydronephrosis, left > right. I was asked to see her for consultation and for further evaluation and management. On her initial visit I performed a transvaginal biopsy of the pelvic mass. I also sent her for tumor markers. Her CEA was normal.  Her CA-125 was normal.  Her CA 19-9 was elevated at 338. 
  
FINAL PATHOLOGIC DIAGNOSIS Cervix/vaginal apex, biopsy:  
High-grade adenocarcinoma  
  
I also requested that she try to obtain a copy of the CT on disc and to see if they could find her initial pathology report from . This demonstrated a stage IIC, poorly differentiated, ovarian carcinoma, favor endometrioid type.   
  
She presented to review the pathology results and the CT images, as well as to discuss a definitive treatment plan. I did not think she was a good candidate for surgical resection at the time, as I didn't think I could could clear surgical margins. Due to her age I recommended weekly Taxol/Carbo, rather than every 3 weeks. I felt that she would tolerate that much better.   Because she lived in [facility], traveling back and forth to [place] would be difficult for her, I referred her to Dr. Erik Aponte with [facility].   
  
She was treated with chemotherapy with Dr. Sidney Mace and apparently had a good response. Her most recent CA-125 was normal.  She last saw him in the office a couple of months ago. She reported some recent vaginal spotting, similar to when she was diagnosed with recurrent disease. I saw her in the office on 9/18/18 and performed an exam  I noted a 4 x 4 cm lesion at the left vaginal apex, consistent with residual/recurrent disease. It was friable on exam.  I sent her for a CT of the abdomen/pelvis to evaluate. I also checked tumor markers. Her CA-125 and CA 19-9 were both normal.   
  
  
ROS: 
 and GI review:  Negative Cardiopulmonary review:  Negative Musculoskeletal:  Negative 
  
A comprehensive review of systems was negative except for that written in the History of Present Illness. , 10 point ROS Problem List: 
Patient Active Problem List  
 Diagnosis Date Noted  Malignant neoplasm of left ovary ([facility] [address] 12/06/2016  Pelvic mass 11/29/2016 PMH: 
Past Medical History:  
Diagnosis Date  Cancer ([address] 2001 Ovarian Cancer  Diabetes ([facility] [address] PSH: 
Past Surgical History:  
Procedure Laterality Date [place] Appendectomy  HX HYSTERECTOMY  2001 Social History: 
Social History Substance Use Topics  Smoking status: Never Smoker  Smokeless tobacco: Never Used  Alcohol use No  
  
Family History: 
Family History Problem Relation Age of Onset  Heart Disease Sister Medications: (reviewed) No current facility-administered medications for this encounter. Current Outpatient Prescriptions Medication Sig  
 hydroCHLOROthiazide (HYDRODIURIL) 25 mg tablet TAKE 1 TABLET BY MOUTH 3 TIMES A DAY  sAXagliptin (ONGLYZA) 5 mg tab tablet Take  by mouth daily.  losartan (COZAAR) 100 mg tablet Take 100 mg by mouth daily.  hydrALAZINE (APRESOLINE) 25 mg tablet Take 25 mg by mouth three (3) times daily.  enalapril (VASOTEC) 20 mg tablet Take 20 mg by mouth daily.  glipiZIDE (GLUCOTROL) 10 mg tablet Take 10 mg by mouth two (2) times a day.  latanoprost (XALATAN) 0.005 % ophthalmic solution Administer 1 Drop to both eyes nightly. Allergies: (reviewed) No Known Allergies OBJECTIVE: 
 
Physical Exam: VITAL SIGNS: There were no vitals filed for this visit. There is no height or weight on file to calculate BMI. GENERAL ALEENA: Conversant, alert, oriented. No acute distress. HEENT: HEENT. No thyroid enlargement. No JVD. Neck: Supple without restrictions. RESPIRATORY: Clear to auscultation and percussion to the bases. No CVAT. CARDIOVASC: RRR without murmur/rub. GASTROINT: soft, non-tender, without masses or organomegaly MUSCULOSKEL: no joint tenderness, deformity or swelling EXTREMITIES: extremities normal, atraumatic, no cyanosis or edema PELVIC: (9/18/18 exam) Normal external genitalia. Normal vagina. Mass at vaginal apex measuring about 4 x 4 cm, consistent with residual/recurrent disease. The mass is mobile and non-tender. RECTAL: Deferred ABE SURVEY: No suspicious lymphadenopathy or edema noted. NEURO: Grossly intact. No acute deficit. Lab Data: 
 
Lab Results Component Value Date/Time WBC 10.0 09/18/2018 02:35 PM  
 HGB 11.4 09/18/2018 02:35 PM  
 HCT 34.7 09/18/2018 02:35 PM  
 PLATELET 858 92/12/6211 02:35 PM  
 MCV 91 09/18/2018 02:35 PM  
 
Lab Results Component Value Date/Time  Sodium 133 (L) 09/18/2018 02:35 PM  
 Potassium 5.9 (H) 09/18/2018 02:35 PM  
 Chloride 98 09/18/2018 02:35 PM  
 CO2 22 09/18/2018 02:35 PM  
 Glucose 96 09/18/2018 02:35 PM  
 BUN 36 09/18/2018 02:35 PM  
 Creatinine 1.48 (H) 09/18/2018 02:35 PM  
 BUN/Creatinine ratio 24 09/18/2018 02:35 PM  
 GFR est AA 35 (L) 09/18/2018 02:35 PM  
 GFR est non-AA 31 (L) 09/18/2018 02:35 PM  
 Calcium 9.5 09/18/2018 02:35 PM  
 
 
 
CT of abdomen/pelvis (9/18/18) LOWER CHEST: There is minimal scar or atelectasis at the lung bases. There are 
calcifications of the aortic valve and coronary arteries. The absence of intravenous contrast material reduces the sensitivity for 
evaluation of the solid parenchymal organs of the abdomen. ABDOMEN: 
Liver: The liver is normal in size and contour with no focal abnormality. Gallbladder and bile ducts: There are no calcified stones and there is no 
biliary duct dilatation. Spleen: No abnormality. Pancreas: No abnormality. Adrenal glands: No abnormality. Kidneys: There is a small left renal cyst and left renal cortical thinning. There is no renal or ureteral calculus or obstruction. PELVIS: 
Reproductive organs: The uterus and ovaries are absent. There is a 3.5 x 3.1 x 
3.4 cm soft tissue mass in the left pelvic cul-de-sac between the urinary 
bladder and rectum.   
Bladder: No abnormality. RETROPERITONEUM: The aorta is atherosclerotic and tapers without aneurysm. There 
is no retroperitoneal adenopathy or mass. There is no pelvic mass or adenopathy. BOWEL AND MESENTERY: The small bowel is normal. The appendix is absent. PERITONEUM: There is no ascites or free intraperitoneal air. BONES AND SOFT TISSUES: There are degenerative changes of the spine. There are 
surgical clips in the upper anterior abdominal wall. 
  
IMPRESSION:  
1. Status post hysterectomy and bilateral oophorectomy. 2. Left pelvic soft tissue mass. 3. No other evidence of recurrent or metastatic disease within the abdomen or 
pelvis. 4. 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 
5. Atherosclerotic abdominal aorta without aneurysm. 6. Status post appendectomy. 7. Lumbar spondylosis.  
  
  
IMPRESSION/PLAN: 
Hong Buck is a 80 y.o. female with a history of recurrent ovarian cancer. She now has recurrent mass at the left upper vagina. She had a supracervical hysterectomy, but this does not appear consistent with a cervix on examination, and it is in the same location as the prior biopsied tumor. We discussed options but we decided on laparoscopic evaluation with resection. This would likely include removing the residual cervix as well. She was counseled on the risks, benefits, indications, and alternatives of surgery. Her questions were answered and she wishes to proceed.   
 
 
 
Signed By: Alessia Arciniega MD   
 10/8/2018/7:33 AM

## 2018-10-08 NOTE — PERIOP NOTES
Patient: Dalila Starr MRN: [id number]  SSN: [id number] YOB: 1927  Age: 80 y.o. Sex: female Patient is status post Procedure(s): LAPAROSCOPIC RESECTION OF PELVIC  MASS, CYSTOSCOPY, TRACHEALECTOMY. Surgeon(s) and Role: Astrid Quintanilla MD - Primary Endy Ledbetter MD - Assisting Peripheral IV 10/08/18 Left Wrist (Active) Dressing/Packing:  Wound Abdomen Anterior-DRESSING TYPE: Topical skin adhesive/glue (10/08/18 1300)

## 2018-10-08 NOTE — PROGRESS NOTES
TRANSFER - IN REPORT: 
 
Verbal report received from [facility] (name) on Sherin Ryan  being received from PACU (unit) for routine post - op Report consisted of patients Situation, Background, Assessment and  
Recommendations(SBAR). Information from the following report(s) SBAR, Kardex, OR Summary, Procedure Summary, Intake/Output, MAR and Accordion was reviewed with the receiving nurse. Opportunity for questions and clarification was provided. Assessment completed upon patients arrival to unit and care assumed.

## 2018-10-08 NOTE — DISCHARGE INSTRUCTIONS
[facility]  [address] [facility], [address], [address]  P [phone]  F [phone]     [address] [place] [address],      Please review your instructions with your nurse and ask any questions so you have all the information you need to recover well at home. If you do not feel you have everything you need to succeed and be safe after you leave the hospital, please discuss these concerns with your nurse. As always, call for any questions at home. Your doctor: Michail Schaumann, MD  Diagnosis: PELVIC, OVARIAN CANCER  Pelvic cancer ([facility] [address]  Procedure: Procedure(s):  LAPAROSCOPIC RESECTION OF PELVIC  MASS, CYSTOSCOPY, TRACHEALECTOMY  Date of Discharge: 10/9/18      Take Home Medications     See Discharge Medication Review provided to you by your nurse. If you did not receive one, request this prior to your discharge. · It is important that you take your medications as they are prescribed. · Keep your medications in the bottles provided by the pharmacist and keep a list of the medication names, dosages, times to be taken and what they are for in your wallet. · Do not take other medications without consulting your doctor. · If you are prescribed enoxaparin (Lovenox) and are taking a baby aspirin daily, please hold this medication until your course of enoxaparin is completed. · If you no longer need your prescribed pain medication, you may take Tylenol or Aleve alone. · You should take a daily gentle stool softener such as a colace pill or dulcolax suppository for constipation as this is not uncommon after surgery and/or while on pain medication. If not prescribed, this can be found over the counter. If constipation persists for >24 hours you should take a dose of Milk of Magnesia. Call if your constipation continues. Diet    · Stay hydrated and drink fluids such as gatorade and water. This will also help prevent constipation and dehydration.  Limit somewhat any usual caffeine intake of beverages such as soda, tea and coffee as this may serve to dehydrate you. · For the first 2-3 days keep a low fiber diet avoiding raw vegetables or fruits with skin. A diet consisting of soup, cereal, yogurt, eggs, fish, Boost/Ensure. Avoid fatty/greasy foods. · If nauseated, keep your diet limited to liquids and call if this persists. Activity    · If possible, have someone with you at all times until you feel stable. · Gradually increase your activity each day. There are generally no restrictions on walking, climbing stairs or riding in a car. Try to walk at least 4 times per day. · Showers are okay. If you have an incision, no tub bathing/swimming for two weeks. · No driving for 1 week and/or while on pain medication. · There are no lifting restrictions if you did not have surgery. · No lifting greater than 15 lbs for 3 weeks. Incision    · You should expect some discomfort in the area of your incision, particularly as you increase your activity. If you notice an area of increasing redness or new drainage, please call your doctor. · Staples are generally removed in 10-14 days. · Many incisions will have buried absorbable sutures, which do not need to be removed and are covered by protective glue. This will come off over time. Causes For Concern    If any of the following occur, please call our office and speak with the Nurse/aid who will help you with your problem or ask the doctor to call you. Problems with the incision, including increasing pain, swelling, redness or drainage. Increasing abdominal pain despite medication  Persisting nausea or vomiting. Fever or chills and a temperature >101F  Constipation (no bowel movement for three days). Diarrhea (more than three watery stools within 24 hours). Excessive vaginal or wound bleeding. If after hours and you cannot reach an on-call physician, call 911.       Follow-Up    Call [phone] to schedule an appointment with Neris Bunch MD in 4 week. Information obtained by :  I understand that if any problems occur once I am at home I am to contact my physician. I understand and acknowledge receipt of the instructions indicated above.                                                                                                                                            Physician's or R.N.'s Signature                                                                  Date/Time                                                                                                                                              Patient or Representative Signature                                                          Date/Time

## 2018-10-08 NOTE — PERIOP NOTES
Patient interviewed in holding area, identity and procedure verified. 1000 ml 0.9% NaCl as irrigation. Family member (son)updated as to start of surgery.

## 2018-10-08 NOTE — PROGRESS NOTES
Lovenox Monitoring Indication: DVT Prophylaxis s/p GYN SURGERY No results for input(s): HGB, PLT, INR, CREA, HGBEXT, PLTEXT in the last 72 hours. No lab exists for component: INREXT Current Weight: 70.3 kg 
Est. CrCl = 23 ml/min (from SCr = 1.48 on 9/18) - BMP ordered for tomorrow AM 
Current Dose: 40 mg subcutaneously every 24 hours. Plan: Change to 30 mg every 24 hrs for CrCl < 30 ml/min

## 2018-10-08 NOTE — IP AVS SNAPSHOT
[address] 
[phone] Patient: Sherry Villanueva MRN: [id number] CRA:0/2/1308 About your hospitalization You were admitted on:  October 8, 2018 You last received care in the:  [address] [place] [address] You were discharged on:  October 9, 2018 Why you were hospitalized Your primary diagnosis was:  Not on File Your diagnoses also included:  Pelvic Cancer (Hcc) Follow-up Information Follow up With Details Comments Contact Info Myranda Sherwood MD   [address] [place] [address] [address] Allyssa Heller [address] 
[phone] Pete Dumont MD Schedule an appointment as soon as possible for a visit in 1 month  [address] [address] [address] 
[phone] Discharge Orders None A check susi indicates which time of day the medication should be taken. My Medications START taking these medications Instructions Each Dose to Equal  
 Morning Noon Evening Bedtime HYDROcodone-acetaminophen 5-325 mg per tablet Commonly known as:  Mika Sosa Your last dose was: Your next dose is: Take 1 Tab by mouth every six (6) hours as needed for Pain. Max Daily Amount: 4 Tabs. 1 Tab  
    
   
   
   
  
 polyethylene glycol 17 gram/dose powder Commonly known as:  Marcellina Dull Your last dose was: Your next dose is: Take 17 g by mouth daily. 17 g CONTINUE taking these medications Instructions Each Dose to Equal  
 Morning Noon Evening Bedtime  
 enalapril 20 mg tablet Commonly known as:  Frutoso Meckel Your last dose was: Your next dose is: Take 20 mg by mouth daily. 20 mg  
    
   
   
   
  
 glipiZIDE 10 mg tablet Commonly known as:  Ju Bender Your last dose was: Your next dose is: Take 10 mg by mouth two (2) times a day. 10 mg  
    
   
   
   
  
 hydrALAZINE 25 mg tablet Commonly known as:  APRESOLINE Your last dose was: Your next dose is: Take 25 mg by mouth three (3) times daily. 25 mg  
    
   
   
   
  
 hydroCHLOROthiazide 25 mg tablet Commonly known as:  HYDRODIURIL Your last dose was: Your next dose is: TAKE 1 TABLET BY MOUTH 3 TIMES A DAY  
     
   
   
   
  
 latanoprost 0.005 % ophthalmic solution Commonly known as:  Author Jacyap Your last dose was: Your next dose is:    
   
   
 Administer 1 Drop to both eyes nightly. 1 Drop  
    
   
   
   
  
 losartan 100 mg tablet Commonly known as:  COZAAR Your last dose was: Your next dose is: Take 100 mg by mouth daily. 100 mg ONGLYZA 5 mg Tab tablet Generic drug:  sAXagliptin Your last dose was: Your next dose is: Take  by mouth daily. Where to Get Your Medications Information on where to get these meds will be given to you by the nurse or doctor. ! Ask your nurse or doctor about these medications HYDROcodone-acetaminophen 5-325 mg per tablet  
 polyethylene glycol 17 gram/dose powder Opioid Education Prescription Opioids: What You Need to Know: 
 
Prescription opioids can be used to help relieve moderate-to-severe pain and are often prescribed following a surgery or injury, or for certain health conditions. These medications can be an important part of treatment but also come with serious risks. Opioids are strong pain medicines. Examples include hydrocodone, oxycodone, fentanyl, and morphine. Heroin is an example of an illegal opioid. It is important to work with your health care provider to make sure you are getting the safest, most effective care. WHAT ARE THE RISKS AND SIDE EFFECTS OF OPIOID USE? Prescription opioids carry serious risks of addiction and overdose, especially with prolonged use. An opioid overdose, often marked by slow breathing, can cause sudden death. The use of prescription opioids can have a number of side effects as well, even when taken as directed. · Tolerance-meaning you might need to take more of a medication for the same pain relief · Physical dependence-meaning you have symptoms of withdrawal when the medication is stopped. Withdrawal symptoms can include nausea, sweating, chills, diarrhea, stomach cramps, and muscle aches. Withdrawal can last up to several weeks, depending on which drug you took and how long you took it. · Increased sensitivity to pain · Constipation · Nausea, vomiting, and dry mouth · Sleepiness and dizziness · Confusion · Depression · Low levels of testosterone that can result in lower sex drive, energy, and strength · Itching and sweating RISKS ARE GREATER WITH:      
· History of drug misuse, substance use disorder, or overdose · Mental health conditions (such as depression or anxiety) · Sleep apnea · Older age (72 years or older) · Pregnancy Avoid alcohol while taking prescription opioids. Also, unless specifically advised by your health care provider, medications to avoid include: · Benzodiazepines (such as Xanax or Valium) · Muscle relaxants (such as Soma or Flexeril) · Hypnotics (such as Ambien or Lunesta) · Other prescription opioids KNOW YOUR OPTIONS Talk to your health care provider about ways to manage your pain that don't involve prescription opioids. Some of these options may actually work better and have fewer risks and side effects. Consult your physician before adding or stopping any medications, treatments, or physical activity. Options may include: 
· Pain relievers such as acetaminophen, ibuprofen, and naproxen · Some medications that are also used for depression or seizures · Physical therapy and exercise · Counseling to help patients learn how to cope better with triggers of pain and stress. · Application of heat or cold compress · Massage therapy · Relaxation techniques Be Informed Make sure you know the name of your medication, how much and how often to take it, and its potential risks & side effects. IF YOU ARE PRESCRIBED OPIOIDS FOR PAIN: 
· Never take opioids in greater amounts or more often than prescribed. Remember the goal is not to be pain-free but to manage your pain at a tolerable level. · Follow up with your primary care provider to: · Work together to create a plan on how to manage your pain. · Talk about ways to help manage your pain that don't involve prescription opioids. · Talk about any and all concerns and side effects. · Help prevent misuse and abuse. · Never sell or share prescription opioids · Help prevent misuse and abuse. · Store prescription opioids in a secure place and out of reach of others (this may include visitors, children, friends, and family). · Safely dispose of unused/unwanted prescription opioids: Find your community drug take-back program or your pharmacy mail-back program, or flush them down the toilet, following guidance from the Food and Drug Administration (www.fda.gov/Drugs/ResourcesForYou). · Visit www.cdc.gov/drugoverdose to learn about the risks of opioid abuse and overdose. · If you believe you may be struggling with addiction, tell your health care provider and ask for guidance or call [address] [place] [address] at [phone]-NUAG. Discharge Instructions [address], [address] [place] 
P [phone]  F [phone] YOUR DISCHARGE INSTRUCTIONS Magalys Burrell, Please review your instructions with your nurse and ask any questions so you have all the information you need to recover well at home. If you do not feel you have everything you need to succeed and be safe after you leave the hospital, please discuss these concerns with your nurse. As always, call for any questions at home. Your doctor: Dionisio Sanchez MD 
Diagnosis: PELVIC, OVARIAN CANCER Pelvic cancer ([facility] [address] Procedure: Procedure(s): LAPAROSCOPIC RESECTION OF PELVIC  MASS, CYSTOSCOPY, TRACHEALECTOMY Date of Discharge: 10/9/18 Take Home Medications See Discharge Medication Review provided to you by your nurse. If you did not receive one, request this prior to your discharge. · It is important that you take your medications as they are prescribed. · Keep your medications in the bottles provided by the pharmacist and keep a list of the medication names, dosages, times to be taken and what they are for in your wallet. · Do not take other medications without consulting your doctor. · If you are prescribed enoxaparin (Lovenox) and are taking a baby aspirin daily, please hold this medication until your course of enoxaparin is completed. · If you no longer need your prescribed pain medication, you may take Tylenol or Aleve alone. · You should take a daily gentle stool softener such as a colace pill or dulcolax suppository for constipation as this is not uncommon after surgery and/or while on pain medication. If not prescribed, this can be found over the counter. If constipation persists for >24 hours you should take a dose of Milk of Magnesia. Call if your constipation continues. Diet · Stay hydrated and drink fluids such as gatorade and water. This will also help prevent constipation and dehydration. Limit somewhat any usual caffeine intake of beverages such as soda, tea and coffee as this may serve to dehydrate you.  
· For the first 2-3 days keep a low fiber diet avoiding raw vegetables or fruits with skin. A diet consisting of soup, cereal, yogurt, eggs, fish, Boost/Ensure. Avoid fatty/greasy foods. · If nauseated, keep your diet limited to liquids and call if this persists. Activity · If possible, have someone with you at all times until you feel stable. · Gradually increase your activity each day. There are generally no restrictions on walking, climbing stairs or riding in a car. Try to walk at least 4 times per day. · Showers are okay. If you have an incision, no tub bathing/swimming for two weeks. · No driving for 1 week and/or while on pain medication. · There are no lifting restrictions if you did not have surgery. · No lifting greater than 15 lbs for 3 weeks. Incision · You should expect some discomfort in the area of your incision, particularly as you increase your activity. If you notice an area of increasing redness or new drainage, please call your doctor. · Staples are generally removed in 10-14 days. · Many incisions will have buried absorbable sutures, which do not need to be removed and are covered by protective glue. This will come off over time. Causes For Concern If any of the following occur, please call our office and speak with the Nurse/aid who will help you with your problem or ask the doctor to call you. Problems with the incision, including increasing pain, swelling, redness or drainage. Increasing abdominal pain despite medication Persisting nausea or vomiting. Fever or chills and a temperature >101F Constipation (no bowel movement for three days). Diarrhea (more than three watery stools within 24 hours). Excessive vaginal or wound bleeding. If after hours and you cannot reach an on-call physician, call [phone]. Follow-Up Call [phone] to schedule an appointment with Adela Mittal MD in 4 week.    
 
 
 
Information obtained by : 
I understand that if any problems occur once I am at home I am to contact my physician. I understand and acknowledge receipt of the instructions indicated above. Physician's or R.N.'s Signature                                                                  Date/Time Patient or Representative Signature                                                          Date/Time Introducing [facility]! [employer] introduces WelVU patient portal. Now you can access parts of your medical record, email your doctor's office, and request medication refills online. 1. In your internet browser, go to [email or web address] 2. Click on the First Time User? Click Here link in the Sign In box. You will see the New Member Sign Up page. 3. Enter your WelVU Access Code exactly as it appears below. You will not need to use this code after youve completed the sign-up process. If you do not sign up before the expiration date, you must request a new code. · WelVU Access Code: [email or web address] Expires: 12/17/2018  2:06 PM 
 
4. Enter the last four digits of your Social Security Number (xxxx) and Date of Birth (mm/dd/yyyy) as indicated and click Submit. You will be taken to the next sign-up page. 5. Create a WelVU ID. This will be your WelVU login ID and cannot be changed, so think of one that is secure and easy to remember. 6. Create a WelVU password. You can change your password at any time. 7. Enter your Password Reset Question and Answer. This can be used at a later time if you forget your password. 8. Enter your e-mail address. You will receive e-mail notification when new information is available in [address] 9. Click Sign Up. You can now view and download portions of your medical record. 10. Click the Download Summary menu link to download a portable copy of your medical information. If you have questions, please visit the Frequently Asked Questions section of the [employer] website. Remember, The Blaze is NOT to be used for urgent needs. For medical emergencies, dial 911. Now available from your iPhone and Android! Introducing Kuldeep Frias As a [facility] patient, I wanted to make you aware of our electronic visit tool called Kuldeep Frias. EyeScribes 24/7 allows you to connect within minutes with a medical provider 24 hours a day, seven days a week via a mobile device or tablet or logging into a secure website from your computer. You can access Kuldeep Frias from anywhere in the [place]. A virtual visit might be right for you when you have a simple condition and feel like you just dont want to get out of bed, or cant get away from work for an appointment, when your regular [facility] provider is not available (evenings, weekends or holidays), or when youre out of town and need minor care. Electronic visits cost only $49 and if the EyeScribes 24/7 provider determines a prescription is needed to treat your condition, one can be electronically transmitted to a nearby pharmacy*. Please take a moment to enroll today if you have not already done so. The enrollment process is free and takes just a few minutes. To enroll, please download the EyeScribes 24/[employer] travis to your tablet or phone, or visit [email or web address] to enroll on your computer. And, as an [address] [place] [address] patient with a [employer] account, the results of your visits will be scanned into your electronic medical record and your primary care provider will be able to view the scanned results.    
We urge you to continue to see your regular [facility] provider for your ongoing medical care. And while your primary care provider may not be the one available when you seek a Kuldeep Weinerenrique virtual visit, the peace of mind you get from getting a real diagnosis real time can be priceless. For more information on Kuldeep Weinerjose gfin, view our Frequently Asked Questions (FAQs) at [email or web address]. Sincerely, 
 
Esme England MD 
Chief Medical Officer Dev Liang *:  certain medications cannot be prescribed via Kuldeep Weinerenrique Providers Seen During Your Hospitalization Provider Specialty Primary office phone Ivan Trinidad MD Gynecologic Oncology [phone] Your Primary Care Physician (PCP) Primary Care Physician Office Phone Office Fax [address] [facility], [facility] C/ Gelacio Hess. [facility] [phone] You are allergic to the following No active allergies Recent Documentation Height Weight BMI OB Status Smoking Status 1.626 m 70.3 kg 26.61 kg/m2 Hysterectomy Never Smoker Emergency Contacts Name Discharge Info Relation Home Work [employer] CAREGIVER [3] Child [2] [phone] Patient Belongings The following personal items are in your possession at time of discharge: 
  Dental Appliances: Lowers, Uppers, Partials (sent to pacu)  Visual Aid: Glasses      Home Medications: None   Jewelry: None  Clothing: Footwear, Pants, Undergarments, Socks, Shirt (sent to [facility])    Other Valuables: Cane (sent [facility]) Please provide this summary of care documentation to your next provider. Signatures-by signing, you are acknowledging that this After Visit Summary has been reviewed with you and you have received a copy. Patient Signature:  ____________________________________________________________ Date:  ____________________________________________________________  
  
Yolanda Hi  Provider Signature: ____________________________________________________________ Date:  ____________________________________________________________

## 2018-10-08 NOTE — ANESTHESIA PREPROCEDURE EVALUATION
Anesthetic History No history of anesthetic complications Review of Systems / Medical History Patient summary reviewed, nursing notes reviewed and pertinent labs reviewed Pulmonary Within defined limits Neuro/Psych Within defined limits Cardiovascular Hypertension Exercise tolerance: >4 METS 
  
GI/Hepatic/Renal 
Within defined limits Endo/Other Diabetes Cancer Comments: Pelvic mass   
  Malignant neoplasm of left ovary (HCC) Other Findings Physical Exam 
 
Airway Mallampati: III 
TM Distance: 4 - 6 cm Neck ROM: decreased range of motion Mouth opening: Normal 
 
 Cardiovascular Regular rate and rhythm,  S1 and S2 normal,  no murmur, click, rub, or gallop Rhythm: regular Rate: normal 
 
 
 
 Dental 
 
Dentition: Upper partial plate and Lower partial plate Pulmonary Breath sounds clear to auscultation Abdominal 
GI exam deferred Other Findings Anesthetic Plan ASA: 4 Anesthesia type: general 
 
 
 
 
Induction: Intravenous Anesthetic plan and risks discussed with: Patient

## 2018-10-08 NOTE — PROGRESS NOTES
Bedside shift change report given to Rodney Hardy, RN and Kayce RN (oncoming nurse) by CHEIKH TILLMAN RN (offgoing nurse). Report included the following information SBAR.  
 
 
2100: Pt ambulating in hallway x1, pt tolerated activity well 
0100: Pt ambulating in hallway x1, pt tolerated activity well

## 2018-10-09 VITALS
TEMPERATURE: 97.9 F | WEIGHT: 155 LBS | BODY MASS INDEX: 26.46 KG/M2 | DIASTOLIC BLOOD PRESSURE: 67 MMHG | SYSTOLIC BLOOD PRESSURE: 132 MMHG | HEIGHT: 64 IN | HEART RATE: 66 BPM | RESPIRATION RATE: 16 BRPM | OXYGEN SATURATION: 98 %

## 2018-10-09 LAB
ANION GAP SERPL CALC-SCNC: 10 MMOL/L (ref 5–15)
BUN SERPL-MCNC: 29 MG/DL (ref 6–20)
BUN/CREAT SERPL: 21 (ref 12–20)
CALCIUM SERPL-MCNC: 7.5 MG/DL (ref 8.5–10.1)
CHLORIDE SERPL-SCNC: 97 MMOL/L (ref 97–108)
CO2 SERPL-SCNC: 24 MMOL/L (ref 21–32)
CREAT SERPL-MCNC: 1.39 MG/DL (ref 0.55–1.02)
ERYTHROCYTE [DISTWIDTH] IN BLOOD BY AUTOMATED COUNT: 12.5 % (ref 11.5–14.5)
GLUCOSE BLD STRIP.AUTO-MCNC: 115 MG/DL (ref 65–100)
GLUCOSE SERPL-MCNC: 123 MG/DL (ref 65–100)
HCT VFR BLD AUTO: 31 % (ref 35–47)
HGB BLD-MCNC: 10.4 G/DL (ref 11.5–16)
MCH RBC QN AUTO: 30.5 PG (ref 26–34)
MCHC RBC AUTO-ENTMCNC: 33.5 G/DL (ref 30–36.5)
MCV RBC AUTO: 90.9 FL (ref 80–99)
NRBC # BLD: 0 K/UL (ref 0–0.01)
NRBC BLD-RTO: 0 PER 100 WBC
PLATELET # BLD AUTO: 284 K/UL (ref 150–400)
PMV BLD AUTO: 9.4 FL (ref 8.9–12.9)
POTASSIUM SERPL-SCNC: 4.8 MMOL/L (ref 3.5–5.1)
RBC # BLD AUTO: 3.41 M/UL (ref 3.8–5.2)
SERVICE CMNT-IMP: ABNORMAL
SODIUM SERPL-SCNC: 131 MMOL/L (ref 136–145)
WBC # BLD AUTO: 13.7 K/UL (ref 3.6–11)

## 2018-10-09 PROCEDURE — 74011250636 HC RX REV CODE- 250/636: Performed by: PHYSICIAN ASSISTANT

## 2018-10-09 PROCEDURE — 74011250637 HC RX REV CODE- 250/637: Performed by: PHYSICIAN ASSISTANT

## 2018-10-09 PROCEDURE — 82962 GLUCOSE BLOOD TEST: CPT

## 2018-10-09 PROCEDURE — 85027 COMPLETE CBC AUTOMATED: CPT | Performed by: PHYSICIAN ASSISTANT

## 2018-10-09 PROCEDURE — 36415 COLL VENOUS BLD VENIPUNCTURE: CPT | Performed by: PHYSICIAN ASSISTANT

## 2018-10-09 PROCEDURE — 80048 BASIC METABOLIC PNL TOTAL CA: CPT | Performed by: PHYSICIAN ASSISTANT

## 2018-10-09 RX ADMIN — HYDRALAZINE HYDROCHLORIDE 25 MG: 25 TABLET, FILM COATED ORAL at 08:16

## 2018-10-09 RX ADMIN — ENOXAPARIN SODIUM 30 MG: 30 INJECTION SUBCUTANEOUS at 08:16

## 2018-10-09 RX ADMIN — LISINOPRIL 20 MG: 20 TABLET ORAL at 08:16

## 2018-10-09 RX ADMIN — SODIUM CHLORIDE 75 ML/HR: 900 INJECTION, SOLUTION INTRAVENOUS at 04:34

## 2018-10-09 RX ADMIN — DOCUSATE SODIUM 100 MG: 100 CAPSULE, LIQUID FILLED ORAL at 08:16

## 2018-10-09 NOTE — PROGRESS NOTES
2547 - Bedside shift change report given to Genia Leiva. RN (oncoming nurse) by Chavez Dominguez / Al Torres RN (offgoing nurse). Report included the following information SBAR, Kardex, OR Summary, Intake/Output, MAR and Recent Results. 1 - I have reviewed discharge instructions with the patient and Son. The patient and Son verbalized understanding. Denies questions at this time. Prescriptions and discharge folder with instructions given at this time.

## 2018-10-09 NOTE — ANESTHESIA POSTPROCEDURE EVALUATION
Post-Anesthesia Evaluation and Assessment Patient: Latonya Scott MRN: [id number]  SSN: [id number] YOB: 1927  Age: 80 y.o. Sex: female Cardiovascular Function/Vital Signs Visit Vitals  /67 (BP 1 Location: Right arm, BP Patient Position: At rest;Head of bed elevated (Comment degrees))  Pulse 66  Temp 36.6 °C (97.9 °F)  Resp 16  
 Ht 5' 4\" (1.626 m)  Wt 70.3 kg (155 lb)  SpO2 98%  BMI 26.61 kg/m2 Patient is status post general anesthesia for Procedure(s): LAPAROSCOPIC RESECTION OF PELVIC  MASS, CYSTOSCOPY, TRACHEALECTOMY. Nausea/Vomiting: None Postoperative hydration reviewed and adequate. Pain: 
Pain Scale 1: Numeric (0 - 10) (10/09/18 0813) Pain Intensity 1: 0 (10/09/18 0813) Managed Neurological Status:  
Neuro (WDL): Within Defined Limits (10/08/18 1449) At baseline Mental Status and Level of Consciousness: Alert and oriented Pulmonary Status:  
O2 Device: Room air (10/09/18 0813) Adequate oxygenation and airway patent Complications related to anesthesia: None Post-anesthesia assessment completed. No concerns Signed By: Nelson Rangel DO October 9, 2018

## 2018-10-09 NOTE — PROGRESS NOTES
27 [facility], [address] [place] [address] Sheldon LOZOYA [phone]  F [phone] Patient Name: Ky Rob Admit Date: 10/8/2018 OR Date: 10/8/2018 Visit Date: 10/9/2018 SUBJECTIVE: 
 
No complaints this morning. No pain. No nausea. OBJECTIVE: 
 
Patient Vitals for the past 24 hrs: 
 Temp Pulse Resp BP SpO2  
10/09/18 0510 98 °F (36.7 °C) 66 16 125/67 95 % 10/09/18 0101 97.9 °F (36.6 °C) 65 16 118/62 97 % 10/08/18 2055 98 °F (36.7 °C) 83 16 160/65 96 % 10/08/18 1846 97.9 °F (36.6 °C) 76 16 150/63 98 % 10/08/18 1748 97.6 °F (36.4 °C) 61 14 153/68 98 % 10/08/18 1650 97.4 °F (36.3 °C) 63 16 163/67 100 % 10/08/18 1539 97.5 °F (36.4 °C) 61 14 157/63 100 % 10/08/18 1500 - (!) 47 13 142/40 99 % 10/08/18 1445 - (!) 47 12 (!) 126/38 99 % 10/08/18 1430 - (!) 48 14 126/41 98 % 10/08/18 1415 - (!) 57 14 (!) 129/37 98 % 10/08/18 1400 - (!) 56 15 118/44 98 % 10/08/18 1358 97.3 °F (36.3 °C) - - - -  
10/08/18 1355 - 62 15 134/43 99 % 10/08/18 1350 97.3 °F (36.3 °C) (!) 58 15 (!) 122/39 99 % 10/08/18 1346 - - - 123/44 -  
10/08/18 0826 98.2 °F (36.8 °C) 68 16 155/65 97 % Date 10/08/18 0700 - 10/09/18 1437 10/09/18 0700 - 10/10/18 8903 Shift 8249-1005 6875-0576 24 Hour Total 7884-3269 0465-4247 24 Hour Total  
I 
N 
T 
A 
K 
E 
 P.O.  365 365     
   P.O.  365 365 I.V. 
(mL/kg/hr) 1000 
(1.2) 1072.5 
(1.3) 2072.5 
(1.2) I.V. 200  200 Volume (lactated Ringers infusion) 800  800 Volume (0.9% sodium chloride infusion)  1072.5 1072.5 Shift Total 
(mL/kg) 1000 
(14.2) 1437.5 
(20.4) 2437.5 
(34.7) O 
U T 
P 
U Georgetta Roshan Urine (mL/kg/hr) 575 
(0.7) 600 
(0.7) 1175 
(0.7) Urine Output 200  200 Urine Output (mL) ([REMOVED] Urinary Catheter 10/08/18 2- way; [place]) 375 600 975 Blood 10  10 Estimated Blood Loss 10  10 Shift Total 
(mL/kg) 585 
(8.3) 600 
(8.5) 1185 (16.9)  837.5 1252.5 Weight (kg) 70.3 70.3 70.3 70.3 70.3 70.3 Physical Exam 
   General:  alert, cooperative, no distress Cardiac:  Regular rate and rhythm Lungs:  clear to auscultation bilaterally Abdomen:  soft, non-distended, appropriately tender Wound:  clean, dry, no drainage Extremity: extremities normal, atraumatic, no cyanosis or edema Data Review Lab Results Component Value Date/Time WBC 13.7 (H) 10/09/2018 05:24 AM  
 ABS. NEUTROPHILS 5.5 09/18/2018 02:35 PM  
 HGB 10.4 (L) 10/09/2018 05:24 AM  
 HCT 31.0 (L) 10/09/2018 05:24 AM  
 MCV 90.9 10/09/2018 05:24 AM  
 MCH 30.5 10/09/2018 05:24 AM  
 PLATELET 072 83/44/3902 05:24 AM  
 
Lab Results Component Value Date/Time Sodium 131 (L) 10/09/2018 05:24 AM  
 Potassium 4.8 10/09/2018 05:24 AM  
 Chloride 97 10/09/2018 05:24 AM  
 CO2 24 10/09/2018 05:24 AM  
 Glucose 123 (H) 10/09/2018 05:24 AM  
 BUN 29 (H) 10/09/2018 05:24 AM  
 Creatinine 1.39 (H) 10/09/2018 05:24 AM  
 Calcium 7.5 (L) 10/09/2018 05:24 AM  
 Albumin 4.0 09/18/2018 02:35 PM  
 Bilirubin, total 0.3 09/18/2018 02:35 PM  
 AST (SGOT) 20 09/18/2018 02:35 PM  
 ALT (SGPT) 17 09/18/2018 02:35 PM  
 Alk. phosphatase 57 09/18/2018 02:35 PM  
 
IMPRESSION/PLAN: 
 
1 Day Post-Op Procedure(s): LAPAROSCOPIC RESECTION OF PELVIC  MASS, CYSTOSCOPY, TRACHELECTOMY for PELVIC MASS, OVARIAN CANCER Oncologic:  Hx of ovarian cancer. Question if mass involving cervix is recurrent disease or new primary. Frozen section demonstrated poorly differentiated carcinoma. Completely resected clinically. Await final pathology. Heme/CV:  Hemodynamically stable. Renal:  Good UOP. Stable creatinine. FEN/GI:  Diet as tolerated. ID/Wound:  Afebrile. Mildly elevated WBC, likely reactive. PPX:  Ambulation, SCDs, IS. Dispostion:  Doing well postoperatively. Will d/c home today. Operative findings and procedure discussed with patient and son. Tiara Warren MD

## 2018-10-09 NOTE — PHYSICIAN ADVISORY
Short Stay Review Pt Name:  Augustus Sahu MR#  [id number] CSN#   [id number] Room [facility]  315/01  @ [address] [facility]  
Hospitalization date  10/8/2018  7:35 AM 
No discharge date for patient encounter. Current Attending Physician  Sonja Price MD  
 
A discharge order has been placed for this episode of hospital care for Ms. Augustus Sahu; since this hospital stay is less than two midnights, I reviewed Ms. Farida Arce Tenzin's chart. Ms. Violette Hubbard healthcare insurance/benefit include: 
Payor: [place] MEDICARE / Plan: VA MEDICARE PART A & B / Product Type: Medicare /  
 
Utilization Review related case summary:  
Age  80 y.o.  
BMI  Body mass index is 26.61 kg/(m^2). PMHx includes Hospital course  The pt went through planned laparoscopic resection of pelvic mass. She remained hemodynamically stable and documentation PTA does not support inpt level of risk and plan of care On the basis of chart review, this patient's hospitalization status Should be changed to OBSERVATION Giovanna Emery MD MPH FACP Cell : [phone] Physician Advisor Patricia  [address]  
Utilization Review, Care Management CSN:  [id number] TEDDY:   [id number] Admitted on :  10/8/2018 Discharge order

## 2018-10-09 NOTE — PROGRESS NOTES
Problem: Falls - Risk of 
Goal: *Absence of Falls Document Carolyn Avila Fall Risk and appropriate interventions in the flowsheet. Outcome: Progressing Towards Goal 
Fall Risk Interventions: 
Mobility Interventions: Patient to call before getting OOB Medication Interventions: Patient to call before getting OOB, Teach patient to arise slowly Elimination Interventions: Call light in reach Problem: Pressure Injury - Risk of 
Goal: *Prevention of pressure injury Document Flavio Scale and appropriate interventions in the flowsheet. Outcome: Progressing Towards Goal 
Pressure Injury Interventions: Activity Interventions: Increase time out of bed Nutrition Interventions: Document food/fluid/supplement intake

## 2018-10-15 NOTE — OP NOTES
Gynecologic Oncology Operative Report    Elizabet Lobato  10/8/18    Pre-operative dx:  Pelvic mass, history of ovarian cancer    Post-operative dx:  Pelvic mass, history of ovarian cancer    Procedure:  Laparoscopic resection of mass, trachelectomy, cystoscopy    Surgeon:  Tobias Benz MD    Assistant:  Diogo Howard MD     Anesthesia:  GETA    EBL:  50 cc    Complications:  None    Specimens:  Vaginal cuff mass, pelvic washings    Operative indications:  81 yo WF with hx of ovarian cancer. Prior supracervical hysterectomy. Noted to have a vaginal cuff/cervical mass in 2015 and was treated with chemotherapy. Recently noted to have vaginal bleeding and imaging suggested a mass at the cuff. Clinically it was consistent with malignancy. It wasn't clear if this involved the cervical stump, but there was not separate cervix identifiable. 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 Operative findings:  Friable 4 cm mass at the vaginal cuff/cervix. Frozen section biopsy demonstrated a poorly different adenocarcinoma. Extensive adhesions involving the small bowel to the anterior abdominal wall from her piror surgery. Small bowel adhesions also noted to the right side of the pelvis. Rectum adherent to the cervical stump/mass. Bladder adhesions to the cervical stump/mass. Flow noted from both ureters a the time of cystoscopy. No injury to the bladder identified. Procedure in detail:  After the risks, benefits, indications, and alternatives of the procedure were discussed with the patient and informed consent was obtained, the patient was taken to the operating room. She was positively identified, administered general anesthesia, and then placed in the dorsal lithotomy position in [address] [place] [address]. An exam under anesthesia was performed. She was then prepped and draped in the usual fashion. A morales catheter was inserted.   A transvaginal biopsy was performed with a [facility] forcep. This was sent for frozen section. A sponge-stick was then placed in the vagina to help delineate during dissection. We then proceeded with laparoscopy. Due to the prior abdominal surgery I elected to proceed with an initial left upper quadrant trocar insertion. I confirmed that anesthesia had placed an orogastric tube and that it was hooked to suction. A small incision was made at the left costal margin in the mid-clavicular line with an #11-blade scalpel. A 5 mm blade-less trocar was then directly inserted, with the camera in position to visualize safe entry. Once proper placement was confirmed, the abdomen was then insufflated with carbon dioxide. There were small bowel adhesions all along the prior abdominal incision, except for a small area at around the umbilicus. A 5 mm blade-less trocar was then inserted in each lower quadrant, midway between the anterior superior iliac spine and the umbilicus. These trocars were inserted under direct visualization to ensure safe entry. I was then able to place a 5 mm trocar in the umbilicus under direct visualization. The abdomen and pelvis were then examined, with the above mentioned findings noted. Pelvic washings were obtained as well. The patient was then placed in Trendelenburg tilt and we then proceeded with the procedure. We elected not to free any of the small bowel adhesion to the anterior abdominal wall, as we could see the pelvis well enough. Taking down all of those adhesions would only add more time to the procedure and potentially risk small bowel injury. We did free the small bowel adhesions to the right pelvic sidewall, as these did hinder visualization. This was accomplished sharply with endoshears. Using the sponge stick to push in and elevate the mass, we then began to dissect the rectum off posteriorly.   This was accomplished with a combination of sharp and blunt dissection, using the endoshears and Harmonic scalpel. We dissected further down the rectovaginal septum until the posterior vaginal wall could be identified and the sponge stick could be noted. We then moved anteriorly and attempted to dissect the bladder off of the cervical stump and upper vagina. It was very difficult to visualize the correct plane to dissect in. I then had the circulating nurse fill the bladder with 250 cc of normal saline and clamp the morales. This allowed us to properly identify the bladder margin. The bladder was then dissected off of the anterior cervix and vagina. A circumferential colpotomy was then performed by using the active blade of the Harmonic Scalpel to cut down onto the sponge-stick. Once the colpotomy was completed, the specimen was delivered transvaginally and sent to pathology. At this point I performed a cystoscopy using the small diagnostic scope. Both ureteral orifices were noted. She was administered methylene blue intravenously. I was able to see flow from both ureters. No bladder injury was identified. A bulb syringe was then placed into the vagina to maintain pneumoperitoneum for vaginal cuff closure. The cuff was then reapproximated with three figure-of-eight stitches of 2-0 PDS suture using the LSI RD-180 suturing device. The sutures were secured in place with the LSI Ti-Knot device. Excellent reapproximation and hemostasis was noted. The pelvis was then irrigated and all pedicles inspected. Once satisfied with hemostasis, the gas was then allowed to escape from the abdomen and the trocars were removed. She was then taken out of Trendelenburg tilt. The incision sites were closed with a stitch of 4-0 Monocryl, followed by a layer of Dermabond. The bulb syringe was then removed from the vagina. The patient was taken out of stirrups, awakened from anesthesia, and taken to the recovery room in stable condition. All instrument, sponge, and needle counts were correct. Esa Montero MD  10/15/2018  2:21 PM

## 2018-10-19 ENCOUNTER — TELEPHONE (OUTPATIENT)
Dept: GYNECOLOGY | Age: 83
End: 2018-10-19

## 2018-10-19 NOTE — TELEPHONE ENCOUNTER
Patient's daughter Carmen Herbert wants to know if patient should still have soft bowels after surgery?

## 2018-10-19 NOTE — TELEPHONE ENCOUNTER
Pt's daughter Jory Keenan was not on KEEGAN, therefore I spoke directly with patient, she states her bowels had been getting back to nromal but this morning they were loose after she ate breakfast today, she is not taking a stool softner, no fever, no abdominal pain.   I advised her to keep a watch over weekend, if she has 5 or more lose/watery stools per day to call our office, or if she develops a fever or abdominal pain, to hydrate, pt verbalized understanding

## 2018-11-07 NOTE — PROGRESS NOTES
Post operative follow up. Pt denies abnormal bleeding or pain. 1. Have you been to the ER, urgent care clinic since your last visit? Hospitalized since your last visit? Yes, seen in her ER after a fall. 2. Have you seen or consulted any other health care providers outside of the [address] [place] [address] since your last visit? Include any pap smears or colon screening.  No

## 2018-11-08 ENCOUNTER — OFFICE VISIT (OUTPATIENT)
Dept: GYNECOLOGY | Age: 83
End: 2018-11-08

## 2018-11-08 VITALS
DIASTOLIC BLOOD PRESSURE: 90 MMHG | WEIGHT: 158.2 LBS | HEART RATE: 80 BPM | BODY MASS INDEX: 27.01 KG/M2 | HEIGHT: 64 IN | SYSTOLIC BLOOD PRESSURE: 188 MMHG

## 2018-11-08 DIAGNOSIS — C53.0 MALIGNANT NEOPLASM OF ENDOCERVIX (HCC): Primary | ICD-10-CM

## 2018-11-08 DIAGNOSIS — C56.2 MALIGNANT NEOPLASM OF LEFT OVARY (HCC): ICD-10-CM

## 2018-11-08 NOTE — PROGRESS NOTES
[address] [place] [address] Mathias Moritz [address], [address] [facility]  [phone]  MD Jennifer Chin MD Giovanna Peters, NP    Office Note  Patient ID:  Name:  Nik Bravo  MRN:  [id number]  :  6/3/1927/89 y.o. Date:  2018      HISTORY OF PRESENT ILLNESS:  Nik Bravo is a 80 y.o.  postmenopausal female who was referred to me for a pelvic mass in 2016 by Dr. Collins Post in [facility], [place]. She reported having a hysterectomy in  for fibroids and ovarian cancer. She said they caught it early so she only needed a little chemotherapy. She presented to the ER at [facility] complaining of vaginal bleeding. A CT was performed there revealed a pelvic mass. They actually called it a uterus, despite her history of hysterectomy. She was also noted to have bilateral hydronephrosis, left > right. I was asked to see her for consultation and for further evaluation and management. On her initial visit I performed a transvaginal biopsy of the pelvic mass. I also sent her for tumor markers. Her CEA was normal.  Her CA-125 was normal.  Her CA 19-9 was elevated at 338. FINAL PATHOLOGIC DIAGNOSIS   Cervix/vaginal apex, biopsy:   High-grade adenocarcinoma     I also requested that she try to obtain a copy of the CT on disc and to see if they could find her initial pathology report from . This demonstrated a stage IIC, poorly differentiated, ovarian carcinoma, favor endometrioid type. She presented to review the pathology results and the CT images, as well as to discuss a definitive treatment plan. I did not think she was a good candidate for surgical resection at the time, as I didn't think I could could clear surgical margins. Due to her age I recommended weekly Taxol/Carbo, rather than every 3 weeks. I felt that she would tolerate that much better.   Because she lived in [facility], [place], traveling back and forth to [address] [facility] would be difficult for her, I referred her to Dr. Dudley Garcia with [facility] [address] [place] [address]. She was treated with chemotherapy with Dr. Bishop Gosselin and apparently had a good response. Her most recent CA-125 was normal.  She last saw him in the office a couple of months ago. She reported some recent vaginal spotting, similar to when she was diagnosed with recurrent disease. I saw her in the office on 9/18/18 and performed an exam  I noted a 4 x 4 cm lesion at the left vaginal apex, consistent with residual/recurrent disease. It was friable on exam.  I sent her for a CT of the abdomen/pelvis to evaluate. I also checked tumor markers. Her CA-125 and CA 19-9 were both normal.  I recommended surgical excision and took her to the OR on 10/8/18 for laparoscopic resection with trachelectomy. Operative findings:  Friable 4 cm mass at the vaginal cuff/cervix. Frozen section biopsy demonstrated a poorly different adenocarcinoma. Extensive adhesions involving the small bowel to the anterior abdominal wall from her piror surgery. Small bowel adhesions also noted to the right side of the pelvis. Rectum adherent to the cervical stump/mass. Bladder adhesions to the cervical stump/mass. Flow noted from both ureters a the time of cystoscopy. No injury to the bladder identified. FINAL PATHOLOGIC DIAGNOSIS   1. 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 Posterior, biopsy:   Benign fibroadipose tissue, no tumor seen   3. 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 She is doing well and is without complaints.         ROS:   and GI review:  Negative  Cardiopulmonary review:  Negative   Musculoskeletal: Negative    A comprehensive review of systems was negative except for that written in the History of Present Illness. , 10 point ROS        Problem List:  Patient Active Problem List    Diagnosis Date Noted    Malignant neoplasm of endocervix ([address] 11/08/2018    Pelvic cancer ([address] 10/08/2018    Malignant neoplasm of left ovary ([address] 12/06/2016    Pelvic mass 11/29/2016     PMH:  Past Medical History:   Diagnosis Date    Cancer ([address] 2001    Ovarian Cancer    Diabetes ([facility]       PSH:  Past Surgical History:   Procedure Laterality Date    ABDOMEN SURGERY PROC UNLISTED  1943    Appendectomy    HX HYSTERECTOMY  2001      Social History:  Social History     Tobacco Use    Smoking status: Never Smoker    Smokeless tobacco: Never Used   Substance Use Topics    Alcohol use: No      Family History:  Family History   Problem Relation Age of Onset    Heart Disease Sister       Medications: (reviewed)  Current Outpatient Medications   Medication Sig    polyethylene glycol (MIRALAX) 17 gram/dose powder Take 17 g by mouth daily.  hydroCHLOROthiazide (HYDRODIURIL) 25 mg tablet TAKE 1 TABLET BY MOUTH 3 TIMES A DAY    sAXagliptin (ONGLYZA) 5 mg tab tablet Take  by mouth daily.  losartan (COZAAR) 100 mg tablet Take 100 mg by mouth daily.  hydrALAZINE (APRESOLINE) 25 mg tablet Take 25 mg by mouth three (3) times daily.  enalapril (VASOTEC) 20 mg tablet Take 20 mg by mouth daily.  glipiZIDE (GLUCOTROL) 10 mg tablet Take 10 mg by mouth two (2) times a day.  latanoprost (XALATAN) 0.005 % ophthalmic solution Administer 1 Drop to both eyes nightly. No current facility-administered medications for this visit. Allergies: (reviewed)  No Known Allergies       OBJECTIVE:    Physical Exam:  VITAL SIGNS: Vitals:    11/08/18 1411   BP: 188/90   Pulse: 80   Weight: 158 lb 3.2 oz (71.8 kg)   Height: 5' 4.02\" (1.626 m)     Body mass index is 27.14 kg/m². GENERAL ALEENA: Conversant, alert, oriented.  No acute distress. HEENT: HEENT. No thyroid enlargement. No JVD. Neck: Supple without restrictions. RESPIRATORY: Clear to auscultation and percussion to the bases. No CVAT. CARDIOVASC: RRR without murmur/rub. GASTROINT: soft, non-tender, without masses or organomegaly   MUSCULOSKEL: no joint tenderness, deformity or swelling   EXTREMITIES: extremities normal, atraumatic, no cyanosis or edema   PELVIC: Normal external genitalia. Normal vagina. Vaginal cuff intact. No evidence of disease. RECTAL: Deferred   ABE SURVEY: No suspicious lymphadenopathy or edema noted. NEURO: Grossly intact. No acute deficit. CT of abdomen/pelvis (9/18/18)  LOWER CHEST: There is minimal scar or atelectasis at the lung bases. There are  calcifications of the aortic valve and coronary arteries. The absence of intravenous contrast material reduces the sensitivity for  evaluation of the solid parenchymal organs of the abdomen. ABDOMEN:  Liver: The liver is normal in size and contour with no focal abnormality. Gallbladder and bile ducts: There are no calcified stones and there is no  biliary duct dilatation. Spleen: No abnormality. Pancreas: No abnormality. Adrenal glands: No abnormality. Kidneys: There is a small left renal cyst and left renal cortical thinning. There is no renal or ureteral calculus or obstruction. PELVIS:  Reproductive organs: The uterus and ovaries are absent. There is a 3.5 x 3.1 x  3.4 cm soft tissue mass in the left pelvic cul-de-sac between the urinary  bladder and rectum. Bladder: No abnormality. RETROPERITONEUM: The aorta is atherosclerotic and tapers without aneurysm. There  is no retroperitoneal adenopathy or mass. There is no pelvic mass or adenopathy. BOWEL AND MESENTERY: The small bowel is normal. The appendix is absent. PERITONEUM: There is no ascites or free intraperitoneal air. BONES AND SOFT TISSUES: There are degenerative changes of the spine.  There are  surgical clips in the upper anterior abdominal wall. IMPRESSION:   1. Status post hysterectomy and bilateral oophorectomy. 2. Left pelvic soft tissue mass. 3. No other evidence of recurrent or metastatic disease within the abdomen or  pelvis. 4. 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  5. Atherosclerotic abdominal aorta without aneurysm. 6. Status post appendectomy. 7. Lumbar spondylosis. IMPRESSION/PLAN:  Sergio Collazo is a 80 y.o. female with a history of recurrent ovarian cancer versus an adenocarcinoma of the cervical stump. She is now s/p surgical resection of a vaginal cuff mass with clear margins. She has no evidence of disease on today's exam.  I reviewed the operative findings and pathology with her and her family. Considering her age and overall health, I do not recommend any additional therapy. Radiation therapy would be an option, but we could also save that and use it at the time of recurrence, if she does recur. She and her family agree. I will see her back in 6 months for pelvic examination.         Signed By: Huong Martinez MD     11/8/2018/1:39 PM

## 2019-05-09 ENCOUNTER — OFFICE VISIT (OUTPATIENT)
Dept: GYNECOLOGY | Age: 84
End: 2019-05-09

## 2019-05-09 VITALS
SYSTOLIC BLOOD PRESSURE: 175 MMHG | BODY MASS INDEX: 26.29 KG/M2 | HEART RATE: 87 BPM | WEIGHT: 154 LBS | HEIGHT: 64 IN | DIASTOLIC BLOOD PRESSURE: 105 MMHG

## 2019-05-09 DIAGNOSIS — C53.0 MALIGNANT NEOPLASM OF ENDOCERVIX (HCC): ICD-10-CM

## 2019-05-09 DIAGNOSIS — C56.2 MALIGNANT NEOPLASM OF LEFT OVARY (HCC): Primary | ICD-10-CM

## 2019-05-09 NOTE — PROGRESS NOTES
[facility]  [address] [facility], [address], [address] [facility]  [phone] [facility] [phone]      Office Note  Patient ID:  Name:  Karrie Mims  MRN:  [id number]  :  6/3/1927/89 y.o. Date:  2019      HISTORY OF PRESENT ILLNESS:  Karrie Mims is a 80 y.o.  postmenopausal female who was referred to me for a pelvic mass in 2016 by Dr. Vanesa Monaco in [facility]. She reported having a hysterectomy in  for fibroids and ovarian cancer. She said they caught it early so she only needed a little chemotherapy. She presented to the ER at [facility] complaining of vaginal bleeding. A CT was performed there revealed a pelvic mass. They actually called it a uterus, despite her history of hysterectomy. She was also noted to have bilateral hydronephrosis, left > right. I was asked to see her for consultation and for further evaluation and management. On her initial visit I performed a transvaginal biopsy of the pelvic mass. I also sent her for tumor markers. Her CEA was normal.  Her CA-125 was normal.  Her CA 19-9 was elevated at 338. FINAL PATHOLOGIC DIAGNOSIS   Cervix/vaginal apex, biopsy:   High-grade adenocarcinoma     I also requested that she try to obtain a copy of the CT on disc and to see if they could find her initial pathology report from . This demonstrated a stage IIC, poorly differentiated, ovarian carcinoma, favor endometrioid type. She presented to review the pathology results and the CT images, as well as to discuss a definitive treatment plan. I did not think she was a good candidate for surgical resection at the time, as I didn't think I could could clear surgical margins. Due to her age I recommended weekly Taxol/Carbo, rather than every 3 weeks. I felt that she would tolerate that much better.   Because she lived in [facility], traveling back and forth to [place] would be difficult for her, I referred her to Dr. Katheryn Anne with [facility] [address] [place] [address]. She was treated with chemotherapy with Dr. Crispin Velazquez and apparently had a good response. Her most recent CA-125 was normal.  She last saw him in the office a couple of months ago. She reported some recent vaginal spotting, similar to when she was diagnosed with recurrent disease. I saw her in the office on 9/18/18 and performed an exam  I noted a 4 x 4 cm lesion at the left vaginal apex, consistent with residual/recurrent disease. It was friable on exam.  I sent her for a CT of the abdomen/pelvis to evaluate. I also checked tumor markers. Her CA-125 and CA 19-9 were both normal.  I recommended surgical excision and took her to the OR on 10/8/18 for laparoscopic resection with trachelectomy. Operative findings:  Friable 4 cm mass at the vaginal cuff/cervix. Frozen section biopsy demonstrated a poorly different adenocarcinoma. Extensive adhesions involving the small bowel to the anterior abdominal wall from her piror surgery. Small bowel adhesions also noted to the right side of the pelvis. Rectum adherent to the cervical stump/mass. Bladder adhesions to the cervical stump/mass. Flow noted from both ureters a the time of cystoscopy. No injury to the bladder identified. FINAL PATHOLOGIC DIAGNOSIS   1. 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 Posterior, biopsy:   Benign fibroadipose tissue, no tumor seen   3. 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 I reviewed the operative findings and pathology with her and her family. Considering her age and overall health, I did not recommend any additional therapy.   Radiation therapy would be an option, but we could also save that and use it at the time of recurrence, if she does recur. She presents today for follow-up. She is doing well and is without complaints. She denies any vaginal bleeding or discharge. She denies pelvic pain. ROS:   and GI review:  Negative  Cardiopulmonary review:  Negative   Musculoskeletal:  Negative    A comprehensive review of systems was negative except for that written in the History of Present Illness. , 10 point ROS        Problem List:  Patient Active Problem List    Diagnosis Date Noted    Malignant neoplasm of endocervix ([facility] 11/08/2018    Pelvic cancer ([facility] 10/08/2018    Malignant neoplasm of left ovary ([facility] 12/06/2016    Pelvic mass 11/29/2016     PMH:  Past Medical History:   Diagnosis Date    Cancer ([facility] 2001    Ovarian Cancer    Diabetes ([facility]       PSH:  Past Surgical History:   Procedure Laterality Date    ABDOMEN SURGERY PROC UNLISTED  1943    Appendectomy    HX HYSTERECTOMY  2001      Social History:  Social History     Tobacco Use    Smoking status: Never Smoker    Smokeless tobacco: Never Used   Substance Use Topics    Alcohol use: No      Family History:  Family History   Problem Relation Age of Onset    Heart Disease Sister       Medications: (reviewed)  Current Outpatient Medications   Medication Sig    hydroCHLOROthiazide (HYDRODIURIL) 25 mg tablet TAKE 1 TABLET BY MOUTH 3 TIMES A DAY    sAXagliptin (ONGLYZA) 5 mg tab tablet Take  by mouth daily.  losartan (COZAAR) 100 mg tablet Take 100 mg by mouth daily.  hydrALAZINE (APRESOLINE) 25 mg tablet Take 25 mg by mouth three (3) times daily.  enalapril (VASOTEC) 20 mg tablet Take 20 mg by mouth daily.  latanoprost (XALATAN) 0.005 % ophthalmic solution Administer 1 Drop to both eyes nightly.  polyethylene glycol (MIRALAX) 17 gram/dose powder Take 17 g by mouth daily.  glipiZIDE (GLUCOTROL) 10 mg tablet Take 10 mg by mouth two (2) times a day.      No current facility-administered medications for this visit. Allergies: (reviewed)  No Known Allergies       OBJECTIVE:    Physical Exam:  VITAL SIGNS: Vitals:    05/09/19 1408 05/09/19 1413   BP: (!) 198/103 (!) 175/105   Pulse: 86 87   Weight: 154 lb (69.9 kg)    Height: 5' 4.02\" (1.626 m)      Body mass index is 26.42 kg/m². GENERAL ALEENA: Conversant, alert, oriented. No acute distress. HEENT: HEENT. No thyroid enlargement. No JVD. Neck: Supple without restrictions. RESPIRATORY: Clear to auscultation and percussion to the bases. No CVAT. CARDIOVASC: RRR without murmur/rub. GASTROINT: soft, non-tender, without masses or organomegaly   MUSCULOSKEL: no joint tenderness, deformity or swelling   EXTREMITIES: extremities normal, atraumatic, no cyanosis or edema   PELVIC: Normal external genitalia. Normal vagina. Vaginal cuff intact. No evidence of disease. RECTAL: Deferred   ABE SURVEY: No suspicious lymphadenopathy or edema noted. NEURO: Grossly intact. No acute deficit. CT of abdomen/pelvis (9/18/18)  LOWER CHEST: There is minimal scar or atelectasis at the lung bases. There are  calcifications of the aortic valve and coronary arteries. The absence of intravenous contrast material reduces the sensitivity for  evaluation of the solid parenchymal organs of the abdomen. ABDOMEN:  Liver: The liver is normal in size and contour with no focal abnormality. Gallbladder and bile ducts: There are no calcified stones and there is no  biliary duct dilatation. Spleen: No abnormality. Pancreas: No abnormality. Adrenal glands: No abnormality. Kidneys: There is a small left renal cyst and left renal cortical thinning. There is no renal or ureteral calculus or obstruction. PELVIS:  Reproductive organs: The uterus and ovaries are absent. There is a 3.5 x 3.1 x  3.4 cm soft tissue mass in the left pelvic cul-de-sac between the urinary  bladder and rectum. Bladder: No abnormality.   RETROPERITONEUM: The aorta is atherosclerotic and tapers without aneurysm. There  is no retroperitoneal adenopathy or mass. There is no pelvic mass or adenopathy. BOWEL AND MESENTERY: The small bowel is normal. The appendix is absent. PERITONEUM: There is no ascites or free intraperitoneal air. BONES AND SOFT TISSUES: There are degenerative changes of the spine. There are  surgical clips in the upper anterior abdominal wall. IMPRESSION:   1. Status post hysterectomy and bilateral oophorectomy. 2. Left pelvic soft tissue mass. 3. No other evidence of recurrent or metastatic disease within the abdomen or  pelvis. 4. 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  5. Atherosclerotic abdominal aorta without aneurysm. 6. Status post appendectomy. 7. Lumbar spondylosis. IMPRESSION/PLAN:  Fran Post is a 80 y.o. female with a history of recurrent ovarian cancer versus an adenocarcinoma of the cervical stump. She is s/p surgical resection of a vaginal cuff mass with clear margins. She has no evidence of disease on today's exam.  I will see her back in 6 months for continued surveillance.         Signed By: Moustapha Irizarry MD     5/9/2019/1:39 PM

## 2019-11-18 NOTE — PROGRESS NOTES
Six month check up. Pt states no abnormal spotting, bleeding or pain. 1. Have you been to the ER, urgent care clinic since your last visit? Hospitalized since your last visit?no  2. Have you seen or consulted any other health care providers outside of the [address] [place] [address] since your last visit? Include any pap smears or colon screening. no

## 2019-11-19 ENCOUNTER — OFFICE VISIT (OUTPATIENT)
Dept: GYNECOLOGY | Age: 84
End: 2019-11-19

## 2019-11-19 VITALS
DIASTOLIC BLOOD PRESSURE: 90 MMHG | BODY MASS INDEX: 24.59 KG/M2 | WEIGHT: 144 LBS | SYSTOLIC BLOOD PRESSURE: 186 MMHG | HEIGHT: 64 IN | HEART RATE: 87 BPM

## 2019-11-19 DIAGNOSIS — C56.2 MALIGNANT NEOPLASM OF LEFT OVARY (HCC): ICD-10-CM

## 2019-11-19 DIAGNOSIS — C53.0 MALIGNANT NEOPLASM OF ENDOCERVIX (HCC): Primary | ICD-10-CM

## 2019-11-19 RX ORDER — PYRIDOXINE HCL (VITAMIN B6) 100 MG
100 TABLET ORAL
COMMUNITY

## 2019-11-19 RX ORDER — DIAPER,BRIEF,INFANT-TODD,DISP
1 EACH MISCELLANEOUS
COMMUNITY
Start: 2019-09-19

## 2019-11-19 NOTE — PATIENT INSTRUCTIONS
[employer] Activation    Thank you for requesting access to [employer]. Please follow the instructions below to securely access and download your online medical record. [employer] allows you to send messages to your doctor, view your test results, renew your prescriptions, schedule appointments, and more. How Do I Sign Up? 1. In your internet browser, go to [email or web address]. 2. Click on the First Time User? Click Here link in the Sign In box. You will see the New Member Sign Up page. 3. Enter your [employer] Access Code exactly as it appears below. You will not need to use this code after youve completed the sign-up process. If you do not sign up before the expiration date, you must request a new code. [employer] Access Code: [email or web address]  Expires: 1/3/2020  1:24 PM (This is the date your [employer] access code will )    4. Enter the last four digits of your Social Security Number (xxxx) and Date of Birth (mm/dd/yyyy) as indicated and click Submit. You will be taken to the next sign-up page. 5. Create a [employer] ID. This will be your [employer] login ID and cannot be changed, so think of one that is secure and easy to remember. 6. Create a [employer] password. You can change your password at any time. 7. Enter your Password Reset Question and Answer. This can be used at a later time if you forget your password. 8. Enter your e-mail address. You will receive e-mail notification when new information is available in [address] 9. Click Sign Up. You can now view and download portions of your medical record. 10. Click the Download Summary menu link to download a portable copy of your medical information. Additional Information    If you have questions, please visit the Frequently Asked Questions section of the [employer] website at [email or web address] Remember, [employer] is NOT to be used for urgent needs. For medical emergencies, dial 911.

## 2019-11-19 NOTE — PROGRESS NOTES
[facility]  [address] [facility], [address], [address] [facility]  [phone] [facility] [phone]      Office Note  Patient ID:  Name:  Sara Weinberg  MRN:  [id number]  :  6/3/1927/89 y.o. Date:  2019      HISTORY OF PRESENT ILLNESS:  Sara Weinberg is a 80 y.o.  postmenopausal female who was referred to me for a pelvic mass in 2016 by Dr. Maddy Fernandez in [facility]. She reported having a hysterectomy in  for fibroids and ovarian cancer. She said they caught it early so she only needed a little chemotherapy. She presented to the ER at [facility] complaining of vaginal bleeding. A CT was performed there revealed a pelvic mass. They actually called it a uterus, despite her history of hysterectomy. She was also noted to have bilateral hydronephrosis, left > right. I was asked to see her for consultation and for further evaluation and management. On her initial visit I performed a transvaginal biopsy of the pelvic mass. I also sent her for tumor markers. Her CEA was normal.  Her CA-125 was normal.  Her CA 19-9 was elevated at 338. FINAL PATHOLOGIC DIAGNOSIS   Cervix/vaginal apex, biopsy:   High-grade adenocarcinoma     I also requested that she try to obtain a copy of the CT on disc and to see if they could find her initial pathology report from . This demonstrated a stage IIC, poorly differentiated, ovarian carcinoma, favor endometrioid type. She presented to review the pathology results and the CT images, as well as to discuss a definitive treatment plan. I did not think she was a good candidate for surgical resection at the time, as I didn't think I could could clear surgical margins. Due to her age I recommended weekly Taxol/Carbo, rather than every 3 weeks. I felt that she would tolerate that much better.   Because she lived in [facility], traveling back and forth to [address]  would be difficult for her, I referred her to Dr. Milton Escamilla with [facility] of [address] [place] [address]. She was treated with chemotherapy with Dr. Calvin Sargent and apparently had a good response. Her most recent CA-125 was normal.  She last saw him in the office a couple of months ago. She reported some recent vaginal spotting, similar to when she was diagnosed with recurrent disease. I saw her in the office on 9/18/18 and performed an exam  I noted a 4 x 4 cm lesion at the left vaginal apex, consistent with residual/recurrent disease. It was friable on exam.  I sent her for a CT of the abdomen/pelvis to evaluate. I also checked tumor markers. Her CA-125 and CA 19-9 were both normal.  I recommended surgical excision and took her to the OR on 10/8/18 for laparoscopic resection with trachelectomy. Operative findings:  Friable 4 cm mass at the vaginal cuff/cervix. Frozen section biopsy demonstrated a poorly different adenocarcinoma. Extensive adhesions involving the small bowel to the anterior abdominal wall from her piror surgery. Small bowel adhesions also noted to the right side of the pelvis. Rectum adherent to the cervical stump/mass. Bladder adhesions to the cervical stump/mass. Flow noted from both ureters a the time of cystoscopy. No injury to the bladder identified. FINAL PATHOLOGIC DIAGNOSIS   1. 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 Posterior, biopsy:   Benign fibroadipose tissue, no tumor seen   3. 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 I reviewed the operative findings and pathology with her and her family. Considering her age and overall health, I did not recommend any additional therapy.   Radiation therapy would be an option, but we could also save that and use it at the time of recurrence, if she does recur. She presents today for follow-up. She is doing well and is without complaints. She denies any vaginal bleeding or discharge. She denies pelvic pain. ROS:   and GI review:  Negative  Cardiopulmonary review:  Negative   Musculoskeletal:  Negative    A comprehensive review of systems was negative except for that written in the History of Present Illness. , 10 point ROS        Problem List:  Patient Active Problem List    Diagnosis Date Noted    Malignant neoplasm of endocervix ([facility] 11/08/2018    Pelvic cancer ([facility] 10/08/2018    Malignant neoplasm of left ovary ([facility] 12/06/2016    Pelvic mass 11/29/2016     PMH:  Past Medical History:   Diagnosis Date    Cancer ([facility] 2001    Ovarian Cancer    Diabetes ([facility]       PSH:  Past Surgical History:   Procedure Laterality Date    ABDOMEN SURGERY PROC UNLISTED  1943    Appendectomy    HX HYSTERECTOMY  2001      Social History:  Social History     Tobacco Use    Smoking status: Never Smoker    Smokeless tobacco: Never Used   Substance Use Topics    Alcohol use: No      Family History:  Family History   Problem Relation Age of Onset    Heart Disease Sister       Medications: (reviewed)  Current Outpatient Medications   Medication Sig    pyridoxine, vitamin B6, (VITAMIN B-6) 100 mg tablet Take 100 mg by mouth.  polyethylene glycol (MIRALAX) 17 gram/dose powder Take 17 g by mouth daily.  hydroCHLOROthiazide (HYDRODIURIL) 25 mg tablet TAKE 1 TABLET BY MOUTH 3 TIMES A DAY    sAXagliptin (ONGLYZA) 5 mg tab tablet Take  by mouth daily.  hydrALAZINE (APRESOLINE) 25 mg tablet Take 25 mg by mouth three (3) times daily.  glipiZIDE (GLUCOTROL) 10 mg tablet Take 10 mg by mouth two (2) times a day.  latanoprost (XALATAN) 0.005 % ophthalmic solution Administer 1 Drop to both eyes nightly.  biotin 10,000 mcg cap Take 1 Cap by mouth.     losartan (COZAAR) 100 mg tablet Take 100 mg by mouth daily.  enalapril (VASOTEC) 20 mg tablet Take 20 mg by mouth daily. No current facility-administered medications for this visit. Allergies: (reviewed)  No Known Allergies       OBJECTIVE:    Physical Exam:  VITAL SIGNS: Vitals:    11/19/19 1332   BP: 186/90   Pulse: 87   Weight: 144 lb (65.3 kg)   Height: 5' 4.02\" (1.626 m)     Body mass index is 24.71 kg/m². GENERAL ALEENA: Conversant, alert, oriented. No acute distress. HEENT: HEENT. No thyroid enlargement. No JVD. Neck: Supple without restrictions. RESPIRATORY: Clear to auscultation and percussion to the bases. No CVAT. CARDIOVASC: RRR without murmur/rub. GASTROINT: soft, non-tender, without masses or organomegaly   MUSCULOSKEL: no joint tenderness, deformity or swelling   EXTREMITIES: extremities normal, atraumatic, no cyanosis or edema   PELVIC: Normal external genitalia. Normal vagina. Vaginal cuff intact and supported. no masses or nodularity. No evidence of disease. RECTAL: Deferred   ABE SURVEY: No suspicious lymphadenopathy or edema noted. NEURO: Grossly intact. No acute deficit. CT of abdomen/pelvis (9/18/18)  LOWER CHEST: There is minimal scar or atelectasis at the lung bases. There are  calcifications of the aortic valve and coronary arteries. The absence of intravenous contrast material reduces the sensitivity for  evaluation of the solid parenchymal organs of the abdomen. ABDOMEN:  Liver: The liver is normal in size and contour with no focal abnormality. Gallbladder and bile ducts: There are no calcified stones and there is no  biliary duct dilatation. Spleen: No abnormality. Pancreas: No abnormality. Adrenal glands: No abnormality. Kidneys: There is a small left renal cyst and left renal cortical thinning. There is no renal or ureteral calculus or obstruction. PELVIS:  Reproductive organs: The uterus and ovaries are absent.  There is a 3.5 x 3.1 x  3.4 cm soft tissue mass in the left pelvic cul-de-sac between the urinary  bladder and rectum. Bladder: No abnormality. RETROPERITONEUM: The aorta is atherosclerotic and tapers without aneurysm. There  is no retroperitoneal adenopathy or mass. There is no pelvic mass or adenopathy. BOWEL AND MESENTERY: The small bowel is normal. The appendix is absent. PERITONEUM: There is no ascites or free intraperitoneal air. BONES AND SOFT TISSUES: There are degenerative changes of the spine. There are  surgical clips in the upper anterior abdominal wall. IMPRESSION:   1. Status post hysterectomy and bilateral oophorectomy. 2. Left pelvic soft tissue mass. 3. No other evidence of recurrent or metastatic disease within the abdomen or  pelvis. 4. 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  5. Atherosclerotic abdominal aorta without aneurysm. 6. Status post appendectomy. 7. Lumbar spondylosis. IMPRESSION/PLAN:  Kia Woods is a 80 y.o. female with a history of recurrent ovarian cancer versus an adenocarcinoma of the cervical stump. She is s/p surgical resection of a vaginal cuff mass with clear margins. She has no evidence of disease on today's exam.  I will see her back in 6 months for continued surveillance.         Signed By: Mateusz Alexander MD     11/19/2019/1:39 PM

## 2022-03-18 PROBLEM — C53.0 MALIGNANT NEOPLASM OF ENDOCERVIX (HCC): Status: ACTIVE | Noted: 2018-11-08

## 2022-03-19 PROBLEM — C76.3 PELVIC CANCER (HCC): Status: ACTIVE | Noted: 2018-10-08

## 2023-05-17 RX ORDER — GLIPIZIDE 10 MG/1
10 TABLET ORAL 2 TIMES DAILY
COMMUNITY

## 2023-05-17 RX ORDER — BIOTIN 10000 MCG
1 CAPSULE ORAL
COMMUNITY
Start: 2019-09-19

## 2023-05-17 RX ORDER — HYDROCHLOROTHIAZIDE 25 MG/1
1 TABLET ORAL 3 TIMES DAILY
COMMUNITY
Start: 2016-10-09

## 2023-05-17 RX ORDER — PYRIDOXINE HCL (VITAMIN B6) 100 MG
100 TABLET ORAL
COMMUNITY

## 2023-05-17 RX ORDER — LATANOPROST 50 UG/ML
1 SOLUTION/ DROPS OPHTHALMIC
COMMUNITY

## 2023-05-17 RX ORDER — LOSARTAN POTASSIUM 100 MG/1
100 TABLET ORAL DAILY
COMMUNITY

## 2023-05-17 RX ORDER — HYDRALAZINE HYDROCHLORIDE 25 MG/1
25 TABLET, FILM COATED ORAL 3 TIMES DAILY
COMMUNITY

## 2023-05-17 RX ORDER — POLYETHYLENE GLYCOL 3350 17 G/17G
17 POWDER, FOR SOLUTION ORAL DAILY
COMMUNITY
Start: 2018-10-08

## 2023-05-17 RX ORDER — ENALAPRIL MALEATE 20 MG/1
20 TABLET ORAL DAILY
COMMUNITY

## (undated) DEVICE — RD® QUICK LOAD® SURGICAL SUTURE, 2-0 MONOGLIDE®, ABSORBABLE, 53", PURPLE, MONOFILAMENT: Brand: RD® QUICK LOAD®

## (undated) DEVICE — DRAPE,REIN 53X77,STERILE: Brand: MEDLINE

## (undated) DEVICE — TK® TI-KNOT® DEVICE: Brand: TK® TI-KNOT®

## (undated) DEVICE — SUTURE MCRYL SZ 4-0 L27IN ABSRB UD L19MM PS-2 1/2 CIR PRIM Y426H

## (undated) DEVICE — SCISSORS ENDOSCP DIA5MM CRV MPLR CAUT W/ RATCH HNDL

## (undated) DEVICE — INFECTION CONTROL KIT SYS

## (undated) DEVICE — TRAY PREP DRY W/ PREM GLV 2 APPL 6 SPNG 2 UNDPD 1 OVERWRAP

## (undated) DEVICE — SOLUTION IV 1000ML 0.9% SOD CHL

## (undated) DEVICE — SURGICAL PROCEDURE PACK GYN LAPAROSCOPY CUST SMH LF

## (undated) DEVICE — BLADELESS OPTICAL TROCAR WITH FIXATION CANNULA: Brand: VERSAPORT

## (undated) DEVICE — TK® QUICK LOAD® UNIT: Brand: TK® QUICK LOAD®

## (undated) DEVICE — CATH FOL TY IC BAG 16FR 2000ML -- CONVERT TO ITEM 363158

## (undated) DEVICE — GLOVE SURG SZ 75 L1212IN FNGR THK138MIL BRN LTX FREE

## (undated) DEVICE — LIGHT HANDLE: Brand: DEVON

## (undated) DEVICE — 5MM RD180® DEVICE: Brand: RD180® - THE RUNNING DEVICE®

## (undated) DEVICE — SOLUTION IRRIGATION H2O 0797305] ICU MEDICAL INC]

## (undated) DEVICE — STERILE POLYISOPRENE POWDER-FREE SURGICAL GLOVES WITH EMOLLIENT COATING: Brand: PROTEXIS

## (undated) DEVICE — (D)PREP SKN CHLRAPRP APPL 26ML -- CONVERT TO ITEM 371833

## (undated) DEVICE — Device

## (undated) DEVICE — SUTURE SZ 0 27IN 5/8 CIR UR-6  TAPER PT VIOLET ABSRB VICRYL J603H

## (undated) DEVICE — BLADELESS OPTICAL TROCAR WITH FIXATION CANNULA: Brand: VERSAONE

## (undated) DEVICE — REM POLYHESIVE ADULT PATIENT RETURN ELECTRODE: Brand: VALLEYLAB

## (undated) DEVICE — 3000CC GUARDIAN II: Brand: GUARDIAN

## (undated) DEVICE — PAD SANIT NPKN 4IN GRD

## (undated) DEVICE — AGENT HEMSTAT 3GM PURIFIED PLNT STARCH PWD ABSRB ARISTA AH

## (undated) DEVICE — APPLICATOR SURG XL L38CM FOR ARISTA ABSRB HEMSTAT FLEXITIP

## (undated) DEVICE — SYR 50ML LR LCK 1ML GRAD NSAF --

## (undated) DEVICE — APPLICATOR BNDG 1MM ADH PREMIERPRO EXOFIN

## (undated) DEVICE — KENDALL SCD EXPRESS SLEEVES, KNEE LENGTH, MEDIUM: Brand: KENDALL SCD

## (undated) DEVICE — SOLUTION IV STRL H2O 500 ML AQUALITE POUR BTL

## (undated) DEVICE — CYSTO/BLADDER IRRIGATION SET, REGULATING CLAMP

## (undated) DEVICE — SHEARS ENDOSCP L36CM DIA5MM ULTRASONIC CRV TIP W/ ADV

## (undated) DEVICE — UNIVERSAL FIXATION CANNULA: Brand: VERSAONE

## (undated) DEVICE — DEVON™ KNEE AND BODY STRAP 60" X 3" (1.5 M X 7.6 CM): Brand: DEVON